# Patient Record
Sex: MALE | Race: BLACK OR AFRICAN AMERICAN | Employment: OTHER | URBAN - METROPOLITAN AREA
[De-identification: names, ages, dates, MRNs, and addresses within clinical notes are randomized per-mention and may not be internally consistent; named-entity substitution may affect disease eponyms.]

---

## 2017-08-22 ENCOUNTER — HOSPITAL ENCOUNTER (OUTPATIENT)
Dept: LAB | Age: 62
Discharge: HOME | End: 2017-08-22
Attending: PSYCHIATRY & NEUROLOGY | Admitting: PSYCHIATRY & NEUROLOGY

## 2017-08-22 LAB
ALBUMIN SERPL-MCNC: 4.2 GM/DL (ref 3.4–5)
ALP BLD-CCNC: 138 IU/L (ref 40–129)
ALT SERPL-CCNC: 50 U/L (ref 10–40)
ANION GAP SERPL CALCULATED.3IONS-SCNC: 15 MMOL/L (ref 4–16)
AST SERPL-CCNC: 109 IU/L (ref 15–37)
BILIRUB SERPL-MCNC: 0.6 MG/DL (ref 0–1)
BILIRUBIN DIRECT: 0.2 MG/DL (ref 0–0.3)
BILIRUBIN, INDIRECT: 0.4 MG/DL (ref 0–0.7)
BUN BLDV-MCNC: 9 MG/DL (ref 6–23)
CALCIUM SERPL-MCNC: 9.1 MG/DL (ref 8.3–10.6)
CHLORIDE BLD-SCNC: 103 MMOL/L (ref 99–110)
CHOLESTEROL: 136 MG/DL
CO2: 21 MMOL/L (ref 21–32)
CREAT SERPL-MCNC: 0.8 MG/DL (ref 0.9–1.3)
GFR AFRICAN AMERICAN: >60 ML/MIN/1.73M2
GFR NON-AFRICAN AMERICAN: >60 ML/MIN/1.73M2
GLUCOSE BLD-MCNC: 92 MG/DL (ref 70–140)
HDLC SERPL-MCNC: 60 MG/DL
LDL CHOLESTEROL DIRECT: 42 MG/DL
POTASSIUM SERPL-SCNC: 3.9 MMOL/L (ref 3.5–5.1)
SODIUM BLD-SCNC: 139 MMOL/L (ref 135–145)
TOTAL PROTEIN: 7.1 GM/DL (ref 6.4–8.2)
TRIGL SERPL-MCNC: 387 MG/DL
TSH HIGH SENSITIVITY: 2.15 UIU/ML (ref 0.27–4.2)

## 2017-08-24 LAB — T4 TOTAL: 4.62 UG/DL

## 2018-03-26 ENCOUNTER — HOSPITAL ENCOUNTER (OUTPATIENT)
Dept: LAB | Age: 63
Discharge: HOME | End: 2018-03-26
Attending: PSYCHIATRY & NEUROLOGY | Admitting: PSYCHIATRY & NEUROLOGY

## 2018-03-26 LAB
ALBUMIN SERPL-MCNC: 4.1 GM/DL (ref 3.4–5)
ALP BLD-CCNC: 150 IU/L (ref 40–129)
ALT SERPL-CCNC: 18 U/L (ref 10–40)
ANION GAP SERPL CALCULATED.3IONS-SCNC: 16 MMOL/L (ref 4–16)
AST SERPL-CCNC: 45 IU/L (ref 15–37)
BILIRUB SERPL-MCNC: 0.5 MG/DL (ref 0–1)
BILIRUBIN DIRECT: 0.2 MG/DL (ref 0–0.3)
BILIRUBIN, INDIRECT: 0.3 MG/DL (ref 0–0.7)
BUN BLDV-MCNC: 6 MG/DL (ref 6–23)
CALCIUM SERPL-MCNC: 9.7 MG/DL (ref 8.3–10.6)
CHLORIDE BLD-SCNC: 101 MMOL/L (ref 99–110)
CHOLESTEROL: 122 MG/DL
CO2: 24 MMOL/L (ref 21–32)
CREAT SERPL-MCNC: 0.8 MG/DL (ref 0.9–1.3)
GFR AFRICAN AMERICAN: >60 ML/MIN/1.73M2
GFR NON-AFRICAN AMERICAN: >60 ML/MIN/1.73M2
GLUCOSE BLD-MCNC: 99 MG/DL (ref 70–99)
HDLC SERPL-MCNC: 70 MG/DL
LDL CHOLESTEROL DIRECT: 48 MG/DL
POTASSIUM SERPL-SCNC: 3.9 MMOL/L (ref 3.5–5.1)
SODIUM BLD-SCNC: 141 MMOL/L (ref 135–145)
TOTAL PROTEIN: 7.7 GM/DL (ref 6.4–8.2)
TRIGL SERPL-MCNC: 102 MG/DL
TSH HIGH SENSITIVITY: 1.43 UIU/ML (ref 0.27–4.2)

## 2018-03-27 LAB — T4 TOTAL: 4.32 UG/DL

## 2018-07-02 ENCOUNTER — HOSPITAL ENCOUNTER (OUTPATIENT)
Dept: LAB | Age: 63
Discharge: HOME | End: 2018-07-02
Attending: INTERNAL MEDICINE | Admitting: INTERNAL MEDICINE

## 2018-07-02 LAB
ALBUMIN SERPL-MCNC: 4.5 GM/DL (ref 3.4–5)
ALP BLD-CCNC: 142 IU/L (ref 40–129)
ALT SERPL-CCNC: 77 U/L (ref 10–40)
AST SERPL-CCNC: 147 IU/L (ref 15–37)
BILIRUB SERPL-MCNC: 0.7 MG/DL (ref 0–1)
BILIRUBIN DIRECT: 0.2 MG/DL (ref 0–0.3)
BILIRUBIN, INDIRECT: 0.5 MG/DL (ref 0–0.7)
HEPATITIS B SURFACE ANTIGEN: NON REACTIVE
HEPATITIS C ANTIBODY: NON REACTIVE
TOTAL PROTEIN: 7.7 GM/DL (ref 6.4–8.2)

## 2018-07-03 LAB — HIV SCREEN: NON REACTIVE

## 2018-07-13 PROBLEM — F10.931 ALCOHOL WITHDRAWAL DELIRIUM (HCC): Status: ACTIVE | Noted: 2018-07-13

## 2018-08-09 ENCOUNTER — HOSPITAL ENCOUNTER (OUTPATIENT)
Dept: GENERAL RADIOLOGY | Age: 63
Discharge: HOME | End: 2018-08-09
Attending: NURSE PRACTITIONER | Admitting: NURSE PRACTITIONER

## 2018-08-09 DIAGNOSIS — M25.561 CHRONIC PAIN OF RIGHT KNEE: ICD-10-CM

## 2018-08-09 DIAGNOSIS — G89.29 CHRONIC PAIN OF RIGHT KNEE: ICD-10-CM

## 2018-11-17 ENCOUNTER — HOSPITAL ENCOUNTER (OUTPATIENT)
Dept: CT IMAGING | Age: 63
Discharge: HOME | End: 2018-11-17
Payer: MEDICARE

## 2018-11-17 DIAGNOSIS — R41.3 MEMORY LOSS: ICD-10-CM

## 2018-11-17 PROCEDURE — 70450 CT HEAD/BRAIN W/O DYE: CPT

## 2018-12-31 ENCOUNTER — HOSPITAL ENCOUNTER (OUTPATIENT)
Age: 63
Discharge: HOME | End: 2018-12-31
Payer: MEDICARE

## 2018-12-31 LAB — PROSTATE SPECIFIC ANTIGEN: 0.58 NG/ML (ref 0–4)

## 2018-12-31 PROCEDURE — G0103 PSA SCREENING: HCPCS

## 2018-12-31 PROCEDURE — 36415 COLL VENOUS BLD VENIPUNCTURE: CPT

## 2019-01-16 ENCOUNTER — HOSPITAL ENCOUNTER (OUTPATIENT)
Age: 64
Discharge: HOME | End: 2019-01-16
Payer: MEDICARE

## 2019-01-16 ENCOUNTER — HOSPITAL ENCOUNTER (OUTPATIENT)
Dept: GENERAL RADIOLOGY | Age: 64
Discharge: HOME | End: 2019-01-16
Payer: MEDICARE

## 2019-01-16 DIAGNOSIS — R05.9 COUGH: ICD-10-CM

## 2019-01-16 PROCEDURE — 71046 X-RAY EXAM CHEST 2 VIEWS: CPT

## 2019-03-04 ENCOUNTER — HOSPITAL ENCOUNTER (OUTPATIENT)
Dept: PULMONOLOGY | Age: 64
Discharge: HOME | End: 2019-03-04
Payer: MEDICARE

## 2019-03-04 LAB
DLCO %PRED: 118 %
DLCO PRED: NORMAL ML/MIN/MMHG
DLCO/VA %PRED: NORMAL %
DLCO/VA PRED: NORMAL ML/MIN/MMHG
DLCO/VA: NORMAL ML/MIN/MMHG
DLCO: NORMAL ML/MIN/MMHG
EXPIRATORY TIME-POST: NORMAL SEC
EXPIRATORY TIME: NORMAL SEC
FEF 25-75% %CHNG: NORMAL
FEF 25-75% %PRED-POST: NORMAL %
FEF 25-75% %PRED-PRE: NORMAL L/SEC
FEF 25-75% PRED: NORMAL L/SEC
FEF 25-75%-POST: NORMAL L/SEC
FEF 25-75%-PRE: NORMAL L/SEC
FEV1 %PRED-POST: 108 %
FEV1 %PRED-PRE: 101 %
FEV1 PRED: NORMAL L
FEV1-POST: NORMAL L
FEV1-PRE: NORMAL L
FEV1/FVC %PRED-POST: NORMAL %
FEV1/FVC %PRED-PRE: NORMAL %
FEV1/FVC PRED: NORMAL %
FEV1/FVC-POST: 69 %
FEV1/FVC-PRE: 81 %
FVC %PRED-POST: NORMAL L
FVC %PRED-PRE: NORMAL %
FVC PRED: NORMAL L
FVC-POST: NORMAL L
FVC-PRE: NORMAL L
GAW %PRED: NORMAL %
GAW PRED: NORMAL L/S/CMH2O
GAW: NORMAL L/S/CMH2O
IC %PRED: NORMAL %
IC PRED: NORMAL L
IC: NORMAL L
MEP: NORMAL
MIP: NORMAL
MVV %PRED-PRE: NORMAL %
MVV PRED: NORMAL L/MIN
MVV-PRE: NORMAL L/MIN
PEF %PRED-POST: NORMAL %
PEF %PRED-PRE: NORMAL L/SEC
PEF PRED: NORMAL L/SEC
PEF%CHNG: NORMAL
PEF-POST: NORMAL L/SEC
PEF-PRE: NORMAL L/SEC
RAW %PRED: NORMAL %
RAW PRED: NORMAL CMH2O/L/S
RAW: NORMAL CMH2O/L/S
RV %PRED: NORMAL %
RV PRED: NORMAL L
RV: NORMAL L
SVC %PRED: NORMAL %
SVC PRED: NORMAL L
SVC: NORMAL L
TLC %PRED: 115 %
TLC PRED: NORMAL L
TLC: NORMAL L
VA %PRED: NORMAL %
VA PRED: NORMAL L
VA: NORMAL L
VTG %PRED: NORMAL %
VTG PRED: NORMAL L
VTG: NORMAL L

## 2019-03-04 PROCEDURE — 94727 GAS DIL/WSHOT DETER LNG VOL: CPT

## 2019-03-04 PROCEDURE — 94729 DIFFUSING CAPACITY: CPT

## 2019-03-04 PROCEDURE — 94060 EVALUATION OF WHEEZING: CPT

## 2019-03-04 ASSESSMENT — PULMONARY FUNCTION TESTS
FEV1_PERCENT_PREDICTED_PRE: 101
FEV1_PERCENT_PREDICTED_POST: 108
FEV1/FVC_POST: 69
FEV1/FVC_PRE: 81

## 2019-03-12 ENCOUNTER — HOSPITAL ENCOUNTER (OUTPATIENT)
Dept: SLEEP CENTER | Age: 64
Discharge: HOME | End: 2019-03-12
Payer: MEDICARE

## 2019-03-12 PROCEDURE — 95810 POLYSOM 6/> YRS 4/> PARAM: CPT

## 2019-03-12 ASSESSMENT — SLEEP AND FATIGUE QUESTIONNAIRES
NECK CIRCUMFERENCE (INCHES): 17
HOW LIKELY ARE YOU TO NOD OFF OR FALL ASLEEP WHILE LYING DOWN TO REST IN THE AFTERNOON WHEN CIRCUMSTANCES PERMIT: 1
HOW LIKELY ARE YOU TO NOD OFF OR FALL ASLEEP WHILE SITTING INACTIVE IN A PUBLIC PLACE: 0
HOW LIKELY ARE YOU TO NOD OFF OR FALL ASLEEP WHEN YOU ARE A PASSENGER IN A CAR FOR AN HOUR WITHOUT A BREAK: 0
HOW LIKELY ARE YOU TO NOD OFF OR FALL ASLEEP WHILE WATCHING TV: 0
HOW LIKELY ARE YOU TO NOD OFF OR FALL ASLEEP IN A CAR, WHILE STOPPED FOR A FEW MINUTES IN TRAFFIC: 0
HOW LIKELY ARE YOU TO NOD OFF OR FALL ASLEEP WHILE SITTING AND READING: 0
HOW LIKELY ARE YOU TO NOD OFF OR FALL ASLEEP WHILE SITTING AND TALKING TO SOMEONE: 0
ESS TOTAL SCORE: 1
HOW LIKELY ARE YOU TO NOD OFF OR FALL ASLEEP WHILE SITTING QUIETLY AFTER LUNCH WITHOUT ALCOHOL: 0

## 2019-04-11 ENCOUNTER — HOSPITAL ENCOUNTER (OUTPATIENT)
Dept: SLEEP CENTER | Age: 64
Discharge: HOME | End: 2019-04-11
Payer: MEDICARE

## 2019-04-11 PROCEDURE — 95811 POLYSOM 6/>YRS CPAP 4/> PARM: CPT

## 2019-04-12 NOTE — PROGRESS NOTES
4/12/2019  sleep study  for Viky Arboleda  1955 is complete. Results are pending physician review.     HME: [facility]     Electronically signed by Azael Jerez on 4/12/2019 at 7:18 AM

## 2019-04-18 NOTE — PROGRESS NOTES
Results for the most recent sleep study on Estil Saunas  1955 are finalized and available. Please see media tab.     Electronically signed by Summer Rodriguez on 4/18/2019 at 5:06 AM

## 2019-06-05 ENCOUNTER — HOSPITAL ENCOUNTER (OUTPATIENT)
Dept: GENERAL RADIOLOGY | Age: 64
Discharge: HOME | End: 2019-06-05
Payer: MEDICARE

## 2019-06-05 ENCOUNTER — HOSPITAL ENCOUNTER (OUTPATIENT)
Age: 64
Discharge: HOME | End: 2019-06-05
Payer: MEDICARE

## 2019-06-05 DIAGNOSIS — M54.5 LOW BACK PAIN, UNSPECIFIED BACK PAIN LATERALITY, UNSPECIFIED CHRONICITY, WITH SCIATICA PRESENCE UNSPECIFIED: ICD-10-CM

## 2019-06-05 PROCEDURE — 72100 X-RAY EXAM L-S SPINE 2/3 VWS: CPT

## 2019-06-17 PROBLEM — Z99.89 OSA ON CPAP: Status: ACTIVE | Noted: 2019-06-17

## 2019-06-17 PROBLEM — G47.33 OSA ON CPAP: Status: ACTIVE | Noted: 2019-06-17

## 2019-06-17 PROBLEM — J44.9 COPD, MILD (HCC): Status: ACTIVE | Noted: 2019-06-17

## 2019-12-04 ENCOUNTER — HOSPITAL ENCOUNTER (OUTPATIENT)
Dept: GENERAL RADIOLOGY | Age: 64
Discharge: HOME | End: 2019-12-04
Payer: MEDICARE

## 2019-12-04 ENCOUNTER — HOSPITAL ENCOUNTER (OUTPATIENT)
Age: 64
Discharge: HOME | End: 2019-12-04
Payer: MEDICARE

## 2019-12-04 DIAGNOSIS — R05.3 CHRONIC COUGH: ICD-10-CM

## 2019-12-04 PROCEDURE — 71046 X-RAY EXAM CHEST 2 VIEWS: CPT

## 2020-02-05 ENCOUNTER — INITIAL CONSULT (OUTPATIENT)
Dept: PULMONOLOGY | Age: 65
End: 2020-02-05
Payer: MEDICARE

## 2020-02-05 ENCOUNTER — TELEPHONE (OUTPATIENT)
Dept: PULMONOLOGY | Age: 65
End: 2020-02-05

## 2020-02-05 VITALS
BODY MASS INDEX: 40.09 KG/M2 | WEIGHT: 280 LBS | DIASTOLIC BLOOD PRESSURE: 84 MMHG | OXYGEN SATURATION: 98 % | SYSTOLIC BLOOD PRESSURE: 116 MMHG | HEART RATE: 80 BPM | HEIGHT: 70 IN

## 2020-02-05 PROBLEM — Z72.0 TOBACCO ABUSE: Status: ACTIVE | Noted: 2020-02-05

## 2020-02-05 PROBLEM — R05.3 CHRONIC COUGH: Status: ACTIVE | Noted: 2020-02-05

## 2020-02-05 PROBLEM — R06.02 SHORTNESS OF BREATH: Status: ACTIVE | Noted: 2020-02-05

## 2020-02-05 LAB
EXPIRATORY TIME-POST: NORMAL
EXPIRATORY TIME: NORMAL
FEF 25-75% %CHNG: NORMAL
FEF 25-75% %PRED-POST: NORMAL
FEF 25-75% %PRED-PRE: NORMAL
FEF 25-75% PRED: NORMAL
FEF 25-75%-POST: NORMAL
FEF 25-75%-PRE: NORMAL
FEV1 %PRED-POST: 79.3 %
FEV1 %PRED-PRE: 75.1 %
FEV1 PRED: 3.03 L
FEV1-POST: 2.41 L
FEV1-PRE: 2.28 L
FEV1/FVC %PRED-POST: 98.9 %
FEV1/FVC %PRED-PRE: 90.5 %
FEV1/FVC PRED: 77.5 %
FEV1/FVC-POST: 76.7 %
FEV1/FVC-PRE: 70.2 %
FVC %PRED-POST: 79.6 L
FVC %PRED-PRE: 82.4 %
FVC PRED: 3.94 L
FVC-POST: 3.14 L
FVC-PRE: 3.25 L
PEF %PRED-POST: NORMAL
PEF %PRED-PRE: NORMAL
PEF PRED: NORMAL
PEF%CHNG: NORMAL
PEF-POST: NORMAL
PEF-PRE: NORMAL

## 2020-02-05 PROCEDURE — G8926 SPIRO NO PERF OR DOC: HCPCS | Performed by: INTERNAL MEDICINE

## 2020-02-05 PROCEDURE — G8417 CALC BMI ABV UP PARAM F/U: HCPCS | Performed by: INTERNAL MEDICINE

## 2020-02-05 PROCEDURE — 99204 OFFICE O/P NEW MOD 45 MIN: CPT | Performed by: INTERNAL MEDICINE

## 2020-02-05 PROCEDURE — 4004F PT TOBACCO SCREEN RCVD TLK: CPT | Performed by: INTERNAL MEDICINE

## 2020-02-05 PROCEDURE — 3017F COLORECTAL CA SCREEN DOC REV: CPT | Performed by: INTERNAL MEDICINE

## 2020-02-05 PROCEDURE — G8484 FLU IMMUNIZE NO ADMIN: HCPCS | Performed by: INTERNAL MEDICINE

## 2020-02-05 PROCEDURE — G8427 DOCREV CUR MEDS BY ELIG CLIN: HCPCS | Performed by: INTERNAL MEDICINE

## 2020-02-05 PROCEDURE — 94060 EVALUATION OF WHEEZING: CPT | Performed by: INTERNAL MEDICINE

## 2020-02-05 PROCEDURE — 3023F SPIROM DOC REV: CPT | Performed by: INTERNAL MEDICINE

## 2020-02-05 RX ORDER — DOXYCYCLINE HYCLATE 100 MG
100 TABLET ORAL 2 TIMES DAILY
Qty: 14 TABLET | Refills: 0 | Status: SHIPPED | OUTPATIENT
Start: 2020-02-05 | End: 2020-02-12

## 2020-02-05 RX ORDER — PREDNISONE 1 MG/1
TABLET ORAL
Qty: 38 TABLET | Refills: 0 | Status: SHIPPED | OUTPATIENT
Start: 2020-02-05 | End: 2020-02-25

## 2020-02-05 RX ORDER — OMEPRAZOLE 40 MG/1
40 CAPSULE, DELAYED RELEASE ORAL
Qty: 90 CAPSULE | Refills: 1 | Status: SHIPPED | OUTPATIENT
Start: 2020-02-05 | End: 2020-08-25 | Stop reason: SDUPTHER

## 2020-02-05 ASSESSMENT — PULMONARY FUNCTION TESTS
FEV1/FVC_PERCENT_PREDICTED_POST: 98.9
FVC_PRE: 3.25
FEV1_PRE: 2.28
FEV1_PERCENT_PREDICTED_POST: 79.3
FVC_PERCENT_PREDICTED_POST: 79.6
FEV1_PREDICTED: 3.03
FEV1/FVC_PREDICTED: 77.5
FVC_PERCENT_PREDICTED_PRE: 82.4
FEV1_POST: 2.41
FVC_POST: 3.14
FEV1/FVC_POST: 76.7
FEV1/FVC_PRE: 70.2
FEV1/FVC_PERCENT_PREDICTED_PRE: 90.5
FEV1_PERCENT_PREDICTED_PRE: 75.1
FVC_PREDICTED: 3.94

## 2020-02-05 NOTE — PROGRESS NOTES
Subjective:   CHIEF COMPLAINT / HPI: Chronic cough, mild COPD, shortness of breath, tobacco abuse, obstructive sleep apnea  Linward Cockayne is a 59-year-old male referred here for a second opinion concerning his persistent cough, worsening shortness of breath, mild COPD, tobacco abuse and a history of obstructive sleep apnea presently untreated because of his persistent cough. He feels though there is a catch in his throat and he cannot take a deep breath without coughing or wheezing. He expectorates a small amount of mucus on a daily basis but is not purulent. Unfortunately continues to smoke against medical advice of 1/2 pack a day but most of his adult life he smoked a pack a day for over 40 years. He is not aware of a family history of chronic lung disease or lung cancer. He currently is using Lucetta Hensen, Incruse and has a pro-air rescue inhaler. He also has a nebulizer at home which is malfunctioning and has albuterol solution to use as needed. He is not on home oxygen. He also states that his cough seems to be much worse when he is seen warm houses or has increased stress. He denies significant postnasal drip. He also denies a history of GERD although he has noted some mild chest discomfort at night but no definite signs of reflux. He denies chest pain or hemoptysis and denies a history of heart disease. He does carry history of alcohol abuse. He states that he quit drinking altogether 1-1/2 years ago. He mentions that he is on total disability for multiple medical problems. Office spirometry demonstrates an FEV1 of 2.28 L with an FVC of 3.25 L consistent with a mild obstructive defect.   Following bronchodilators he had a significant response in his small airways  Past Medical History:  Past Medical History:   Diagnosis Date    Asthma     Chronic cough 2/5/2020    COPD, mild ([address] 6/17/2019    Hypertension     Psychiatric problem     Shortness of breath 2/5/2020    Tobacco abuse 2/5/2020 Current Medications:    No current facility-administered medications for this visit. Allergies   Allergen Reactions    Codeine Itching       Social History:    Social History     Socioeconomic History    Marital status: Single     Spouse name: None    Number of children: 2    Years of education: None    Highest education level: None   Occupational History    None   Social Needs    Financial resource strain: None    Food insecurity:     Worry: None     Inability: None    Transportation needs:     Medical: None     Non-medical: None   Tobacco Use    Smoking status: Current Every Day Smoker     Packs/day: 0.75     Years: 48.00     Pack years: 36.00     Types: Cigarettes    Smokeless tobacco: Never Used    Tobacco comment: states only smoke 12 a day   Substance and Sexual Activity    Alcohol use: Yes     Comment: half pint of vodka every day, 6-8 cans of beer a day    Drug use: No    Sexual activity: Never     Partners: Female   Lifestyle    Physical activity:     Days per week: None     Minutes per session: None    Stress: None   Relationships    Social connections:     Talks on phone: None     Gets together: None     Attends Yazidism service: None     Active member of club or organization: None     Attends meetings of clubs or organizations: None     Relationship status: None    Intimate partner violence:     Fear of current or ex partner: None     Emotionally abused: None     Physically abused: None     Forced sexual activity: None   Other Topics Concern    None   Social History Narrative    None       Family History:    Family History   Problem Relation Age of Onset    Cancer Brother          REVIEW OF SYSTEMS:    CONSTITUTIONAL:  negative for fevers, chills, diaphoresis,appetite change, night sweats and unexpected weight change.    HEENT:  negative for hearing loss,  sinus pressure, nasal congestion, epistaxis   RESPIRATORY:  See HPI  CARDIOVASCULAR:  negative for chest pain, significant response in his small airways    Assessment:     1. Chronic cough    2. Shortness of breath    3. Tobacco abuse    4. COPD, mild ([address]    5. SHAHNAZ on CPAP        Plan:   I am not sure what the definite etiology is for his persistent cough but certainly could be related to his persistent tobacco use and mild COPD with an asthmatic tendency. I do like his current bronchodilator therapy but will add a short tapering course of oral prednisone. I also think he is a candidate for long-term GERD prophylaxis and will start omeprazole 20 mg daily. I would like to obtain a CT chest because of his persistent coughing, shortness of breath with tobacco abuse and mild COPD. I did  him on cigarette smoking cessation. Hopefully he will be able to get back on his CPAP once his cough is under better control. I will continue to follow him  Return in about 6 weeks (around 3/18/2020) for Recheck for COPD, Recheck for Cough, Recheck for Shortness of Breath. This dictation was performed with a verbal recognition program and it was checked for errors. It is possible that there are still dictated errors within this office note. Any errors should be brought immediately to my attention for correction. All efforts were made to ensure that this office note is accurate.

## 2020-02-18 ENCOUNTER — HOSPITAL ENCOUNTER (OUTPATIENT)
Dept: CT IMAGING | Age: 65
Discharge: HOME | End: 2020-02-18
Payer: MEDICARE

## 2020-02-18 PROCEDURE — 71250 CT THORAX DX C-: CPT

## 2020-02-19 NOTE — RESULT ENCOUNTER NOTE
I spoke to Mr. Zahra Rosales concerning his CT chest which does demonstrate centrilobular emphysema but also what appears to be infectious bronchiolitis involving the right upper lobe. He states that he is just in the process of finishing his antibiotic and oral steroid and we decided we would wait for several days to see how he does. If he fails to improve then I will see him back in the office sooner.   Once again I urged him to quit smoking as soon as possible

## 2020-02-24 ENCOUNTER — TELEPHONE (OUTPATIENT)
Dept: PULMONOLOGY | Age: 65
End: 2020-02-24

## 2020-02-24 NOTE — TELEPHONE ENCOUNTER
Patient called and states that he is not doing any better. He still has some chest congestion. States he has a productive cough and it is clear in color. No fever or chills. Please advise.

## 2020-02-25 ENCOUNTER — OFFICE VISIT (OUTPATIENT)
Dept: PULMONOLOGY | Age: 65
End: 2020-02-25
Payer: MEDICARE

## 2020-02-25 VITALS
HEIGHT: 70 IN | SYSTOLIC BLOOD PRESSURE: 128 MMHG | HEART RATE: 64 BPM | BODY MASS INDEX: 40.09 KG/M2 | WEIGHT: 280 LBS | OXYGEN SATURATION: 96 % | DIASTOLIC BLOOD PRESSURE: 72 MMHG

## 2020-02-25 PROCEDURE — G8926 SPIRO NO PERF OR DOC: HCPCS | Performed by: INTERNAL MEDICINE

## 2020-02-25 PROCEDURE — G8417 CALC BMI ABV UP PARAM F/U: HCPCS | Performed by: INTERNAL MEDICINE

## 2020-02-25 PROCEDURE — 3023F SPIROM DOC REV: CPT | Performed by: INTERNAL MEDICINE

## 2020-02-25 PROCEDURE — 99213 OFFICE O/P EST LOW 20 MIN: CPT | Performed by: INTERNAL MEDICINE

## 2020-02-25 PROCEDURE — G8427 DOCREV CUR MEDS BY ELIG CLIN: HCPCS | Performed by: INTERNAL MEDICINE

## 2020-02-25 PROCEDURE — 4004F PT TOBACCO SCREEN RCVD TLK: CPT | Performed by: INTERNAL MEDICINE

## 2020-02-25 PROCEDURE — G8484 FLU IMMUNIZE NO ADMIN: HCPCS | Performed by: INTERNAL MEDICINE

## 2020-02-25 PROCEDURE — 3017F COLORECTAL CA SCREEN DOC REV: CPT | Performed by: INTERNAL MEDICINE

## 2020-02-25 RX ORDER — FLUTICASONE PROPIONATE 50 MCG
SPRAY, SUSPENSION (ML) NASAL
Qty: 3 BOTTLE | Refills: 3 | Status: SHIPPED | OUTPATIENT
Start: 2020-02-25

## 2020-02-25 RX ORDER — FLUTICASONE PROPIONATE 50 MCG
2 SPRAY, SUSPENSION (ML) NASAL DAILY
Qty: 1 BOTTLE | Refills: 11 | Status: SHIPPED | OUTPATIENT
Start: 2020-02-25 | End: 2020-02-25

## 2020-02-25 NOTE — PROGRESS NOTES
SUBJECTIVE:  Chief Complaint: Chronic cough, mild COPD, tobacco abuse, SHAHNAZ but not using CPAP presently  Mr. Zarha Rosales called our office complaining of persistent cough and mild sputum expectoration. I asked him to come in to see us so I could reexamine him. He continues on McGee, Incruse and his pro-air rescue inhaler. He states that he has had inhaled nasal steroids in the past but presently is not using them. Because of his persistent cough he has not been using his CPAP. He denies any chest pain or chest discomfort and states that the sputum he is expectorating is not purulent    OBJECTIVE:  /72   Pulse 64   Ht 5' 10\" (1.778 m)   Wt 280 lb (127 kg)   SpO2 96%   BMI 40.18 kg/m²      Physical Exam:  Constitutional:  He appears well developed and well-nourished. Moderately overweight  Neck:  Supple, No palpable lymphadenopathy, No JVD, increased nasal mucosal edema and thin secretions but no evidence of polyposis  Cardiovascular:  S1, S2 Normal, Regular rhythm, no murmurs or gallops, No pericardial  rubs. Pulmonary: Sounds are surprisingly clear throughout all lung fields without wheezing or rhonchi  Abdomen: Not examined  Extremities: no edema, No DVT  Neurologic:  Awake and Alert, No focal deficits    Radiology: Chest x-ray on 12/4/2019 showed no acute process  PFT: Office spirometry on 2/5/2020 demonstrated a mild obstructive defect with a significant response to bronchodilators in his small airways        ASSESSMENT:    1. Chronic cough    2. COPD, mild ([address]    3. SHAHNAZ on CPAP    4. Shortness of breath    5. Tobacco abuse          PLAN:   I am concerned about his nasal congestion and postnasal drip as a possible etiology for his persistent cough. Obviously smoking cessation is paramount. I am going to start him back on Flonase and told him that if his symptoms persist he may require ENT evaluation to make sure he does not have chronic sinusitis.   I will continue to follow him  No follow-ups on

## 2020-03-19 ENCOUNTER — OFFICE VISIT (OUTPATIENT)
Dept: PULMONOLOGY | Age: 65
End: 2020-03-19
Payer: MEDICARE

## 2020-03-19 VITALS
TEMPERATURE: 98.2 F | SYSTOLIC BLOOD PRESSURE: 114 MMHG | WEIGHT: 290 LBS | HEIGHT: 70 IN | DIASTOLIC BLOOD PRESSURE: 74 MMHG | HEART RATE: 66 BPM | OXYGEN SATURATION: 97 % | BODY MASS INDEX: 41.52 KG/M2

## 2020-03-19 PROCEDURE — G8427 DOCREV CUR MEDS BY ELIG CLIN: HCPCS | Performed by: INTERNAL MEDICINE

## 2020-03-19 PROCEDURE — G8417 CALC BMI ABV UP PARAM F/U: HCPCS | Performed by: INTERNAL MEDICINE

## 2020-03-19 PROCEDURE — 4004F PT TOBACCO SCREEN RCVD TLK: CPT | Performed by: INTERNAL MEDICINE

## 2020-03-19 PROCEDURE — 3017F COLORECTAL CA SCREEN DOC REV: CPT | Performed by: INTERNAL MEDICINE

## 2020-03-19 PROCEDURE — 99213 OFFICE O/P EST LOW 20 MIN: CPT | Performed by: INTERNAL MEDICINE

## 2020-03-19 PROCEDURE — G8484 FLU IMMUNIZE NO ADMIN: HCPCS | Performed by: INTERNAL MEDICINE

## 2020-03-19 PROCEDURE — 3023F SPIROM DOC REV: CPT | Performed by: INTERNAL MEDICINE

## 2020-03-19 PROCEDURE — G8926 SPIRO NO PERF OR DOC: HCPCS | Performed by: INTERNAL MEDICINE

## 2020-03-19 NOTE — PROGRESS NOTES
SUBJECTIVE:  Chief Complaint: Mild COPD, chronic cough, obstructive sleep apnea, tobacco abuse  Mr. Alicia Keith states that he went to [place] last week and was put on antibiotics and oral steroids for a bronchitic exacerbation and he has not completed that yet. He refuses the use [facility]. There is been no change in his long acting bronchodilators. He is still not using CPAP because of his persistent cough which has improved. He still notices some hoarseness and inquired about ENT exam.  Unfortunately continues to smoke [address] OBJECTIVE:  /74   Pulse 66   Temp 98.2 °F (36.8 °C)   Ht 5' 10\" (1.778 m)   Wt 290 lb (131.5 kg)   SpO2 97%   BMI 41.61 kg/m²      Physical Exam:  Constitutional:  He appears well developed and well-nourished. Moderately overweight  Neck:  Supple, No palpable lymphadenopathy, No JVD, increased neck girth, not much hoarseness noted over the neck  Cardiovascular:  S1, S2 Normal, Regular rhythm, no murmurs or gallops, No pericardial  rubs. Pulmonary: Breath sounds are fairly clear although mildly diminished bilaterally no hearing wheezing or rhonchi  Abdomen: Not examined  Extremities: no edema, No DVT  Neurologic:  Awake and Alert, No focal deficits    Radiology: CT chest without contrast on 2/18/2020 showed centrilobular emphysema with minimal groundglass opacities in the right upper lobe and borderline enlarged mediastinal lymph node  PFT: Office spirometry on 2/5/2020 demonstrated a mild obstructive defect with a significant response to bronchodilators in his small airways        ASSESSMENT:    1. COPD, mild ([address]    2. SHAHNAZ on CPAP    3. Shortness of breath    4. Chronic cough    5. Tobacco abuse          PLAN:   Once again I urged him to quit smoking as soon as possible. If he continues to have cough or hoarseness and I do think ENT exam would be advisable. I will make no change in his current bronchodilator therapy.   I will continue to follow him  Return in about 6 months (around 9/19/2020) for Recheck for Cough, Recheck for COPD, Recheck for Obstructive Sleep Apnea, Tobacco abuse. This dictation was performed with a verbal recognition program and it was checked for errors. It is possible that there are still dictated errors within this office note. Any errors should be brought immediately to my attention for correction. All efforts were made to ensure that this office note is accurate.

## 2020-04-21 ENCOUNTER — ANESTHESIA EVENT (OUTPATIENT)
Dept: ENDOSCOPY | Age: 65
End: 2020-04-21
Payer: MEDICARE

## 2020-04-21 ASSESSMENT — COPD QUESTIONNAIRES: CAT_SEVERITY: MILD

## 2020-04-21 ASSESSMENT — LIFESTYLE VARIABLES: SMOKING_STATUS: 1

## 2020-04-21 ASSESSMENT — ENCOUNTER SYMPTOMS: SHORTNESS OF BREATH: 1

## 2020-04-21 NOTE — ANESTHESIA PRE PROCEDURE
Department of Anesthesiology  Preprocedure Note       Name:  Iliana Delgado   Age:  59 y.o.  :  1955                                          MRN:  [id number]         Date:  2020      Surgeon: Eren Wade):  Leti David MD    Procedure: EGD ESOPHAGOGASTRODUODENOSCOPY DILATATION (N/A )    Medications prior to admission:   Prior to Admission medications    Medication Sig Start Date End Date Taking?  Authorizing Provider   Melatonin 5 MG CAPS Take 5 mg by mouth nightly   Yes Historical Provider, MD   fluticasone (FLONASE) 50 MCG/ACT nasal spray SHAKE LIQUID AND USE 2 SPRAYS IN EACH NOSTRIL DAILY 20   Chadwick Hammonds MD   omeprazole (PRILOSEC) 40 MG delayed release capsule Take 1 capsule by mouth every morning (before breakfast) 20   Chadwick Hammonds MD   CPAP Machine MISC 6 cm by CPAP route nightly    Historical Provider, MD   INCRUSE ELLIPTA 62.5 MCG/INH AEPB INL 1 PUFF PO AT THE SAME TIME QD 19   Historical Provider, MD   BREO ELLIPTA 100-25 MCG/INH AEPB inhaler INL 1 PUFF PO AT THE SAME TIME QD 19   Historical Provider, MD   traZODone (DESYREL) 100 MG tablet Take 100 mg by mouth nightly 5/15/17   Historical Provider, MD   atorvastatin (LIPITOR) 40 MG tablet Take 40 mg by mouth nightly 17   Historical Provider, MD   doxazosin (CARDURA) 4 MG tablet Take 4 mg by mouth daily  18   Historical Provider, MD   metoprolol tartrate (LOPRESSOR) 25 MG tablet Take 25 mg by mouth 2 times daily 17   Historical Provider, MD   citalopram (CELEXA) 40 MG tablet Take 40 mg by mouth daily    Historical Provider, MD   albuterol sulfate  (90 Base) MCG/ACT inhaler Inhale 2 puffs into the lungs every 4 hours as needed 3/2/17   Historical Provider, MD   folic acid (FOLVITE) 1 MG tablet Take 1 mg by mouth daily 18   Historical Provider, MD   amLODIPine (NORVASC) 5 MG tablet Take 5 mg by mouth daily D 11/10/17   Historical Provider, MD   cetirizine (ZYRTEC) 10 MG tablet Take 10 mg by ([facility] J44.9    SHAHNAZ on CPAP G47.33, Z99.89    Tobacco abuse Z72.0    Shortness of breath R06.02    Chronic cough R05       Past Medical History:        Diagnosis Date    Asthma     Chronic cough 2/5/2020    COPD, mild ([facility] 6/17/2019    Follows with Dr. Michi Tovar GERD (gastroesophageal reflux disease)     Hypertension     Murmur, heart     Follows with PCP    SHAHNAZ on CPAP     Psychiatric problem     Shortness of breath 2/5/2020    Tobacco abuse 2/5/2020       Past Surgical History:        Procedure Laterality Date    CARDIAC CATHETERIZATION  2008    No stents per pt    COLONOSCOPY  2015    Polypectomy - [place]    KNEE ARTHROSCOPY Left Last: 2004    x2  - cleaned up       Social History:    Social History     Tobacco Use    Smoking status: Current Every Day Smoker     Packs/day: 0.75     Years: 50.00     Pack years: 37.50     Types: Cigarettes     Start date: 1970    Smokeless tobacco: Never Used    Tobacco comment: states only smoke 12 a day   Substance Use Topics    Alcohol use: Yes     Alcohol/week: 8.0 standard drinks     Types: 8 Cans of beer per week     Comment: half pint of vodka every day - stopped  3/2020 per pt                                Ready to quit: Not Answered  Counseling given: Not Answered  Comment: states only smoke 12 a day      Vital Signs (Current):   Vitals:    04/17/20 1046   Weight: 280 lb (127 kg)   Height: 5' 10\" (1.778 m)                                              BP Readings from Last 3 Encounters:   03/19/20 114/74   02/25/20 128/72   02/05/20 116/84       NPO Status:                12 hrs. BMI:   Wt Readings from Last 3 Encounters:   03/19/20 290 lb (131.5 kg)   02/25/20 280 lb (127 kg)   02/05/20 280 lb (127 kg)     Body mass index is 40.18 kg/m².     CBC  Lab Results   Component Value Date/Time    WBC 5.9 07/14/2018 06:06 AM    HGB 13.2 (L) 07/14/2018 06:06 AM    HCT 39.6 (L) 07/14/2018 06:06 AM  07/14/2018 06:06 AM     RENAL  Lab Results   Component Value Date/Time     07/14/2018 06:06 AM    K 3.5 07/14/2018 06:06 AM     07/14/2018 06:06 AM    CO2 22 07/14/2018 06:06 AM    BUN 7 07/14/2018 06:06 AM    CREATININE 0.8 (L) 07/14/2018 06:06 AM    GLUCOSE 103 (H) 07/14/2018 06:06 AM       Anesthesia Evaluation  Patient summary reviewed and Nursing notes reviewed no history of anesthetic complications:   Airway: Mallampati: I  TM distance: >3 FB   Neck ROM: full  Mouth opening: > = 3 FB Dental: normal exam         Pulmonary:normal exam    (+) COPD (last exacerbation 3/2020 per Care Everywhere): mild,  shortness of breath:  sleep apnea: on CPAP,  asthma (inhaler x 2): current smoker (1ppd x ~46 years)          Patient smoked on day of surgery. ROS comment: Smoker - 37.5 pack years   Cardiovascular:  Exercise tolerance: poor (<4 METS),   (+) hypertension (on beta blocker and ASA):, hyperlipidemia         Beta Blocker:  Dose within 24 Hrs      ROS comment: Cardiac cath 2011    LM = 0%  LAD = 60-70% proximal.  LCX = 0%  RCA = 0%  Left Ventricular Angiography: Normal left ventricular size and function and Left ventricular ejection fraction (LVEF) of 60 %    Valve Disease: None    Impression:    60-70% proximal LAD stenosis. Medical treatment            Neuro/Psych:   (+) psychiatric history (hx ETOH abuse, onset age 13. Multiple admissions in Care Everywhere for alcohol dependency and alcohol withdrawal syndrome from 1990's Unknown drinking status):depression/anxiety             GI/Hepatic/Renal:   (+) GERD (on PPI):, renal disease (BPH):, morbid obesity (BMI: 40)         ROS comment: dysphagia. Endo/Other: Negative Endo/Other ROS             Pt had no PAT visit        ROS comment: Left knee sx x 2, 2004 Abdominal:   (+) obese,         Vascular: negative vascular ROS.                                    Anesthesia Plan      general     ASA 3           MIPS: Prophylactic antiemetics administered. Anesthetic plan and risks discussed with patient. Annie Souza, DIANA - CNP Chart review only. The patient was not seen or examined by the PAT NP.  4/21/2020        Pre Anesthesia Evaluation complete. Anesthesia plan, risks, benefits, alternatives, and personnel discussed with patient and/or legal guardian. Patient and/or legal guardian verbalized an understanding and agreed to proceed. Anesthesia plan discussed with care team members and agreed upon.   DIANA Stevenson - CRNA  4/22/2020

## 2020-04-22 ENCOUNTER — ANESTHESIA (OUTPATIENT)
Dept: ENDOSCOPY | Age: 65
End: 2020-04-22
Payer: MEDICARE

## 2020-04-22 ENCOUNTER — HOSPITAL ENCOUNTER (OUTPATIENT)
Age: 65
Setting detail: OUTPATIENT SURGERY
Discharge: HOME | End: 2020-04-22
Attending: SPECIALIST | Admitting: SPECIALIST
Payer: MEDICARE

## 2020-04-22 VITALS
RESPIRATION RATE: 1 BRPM | SYSTOLIC BLOOD PRESSURE: 128 MMHG | OXYGEN SATURATION: 78 % | TEMPERATURE: 98.6 F | DIASTOLIC BLOOD PRESSURE: 70 MMHG

## 2020-04-22 VITALS
OXYGEN SATURATION: 96 % | HEART RATE: 76 BPM | SYSTOLIC BLOOD PRESSURE: 129 MMHG | TEMPERATURE: 97.4 F | RESPIRATION RATE: 16 BRPM | WEIGHT: 280 LBS | DIASTOLIC BLOOD PRESSURE: 72 MMHG | HEIGHT: 70 IN | BODY MASS INDEX: 40.09 KG/M2

## 2020-04-22 PROCEDURE — 2580000003 HC RX 258: Performed by: SPECIALIST

## 2020-04-22 PROCEDURE — 7100000001 HC PACU RECOVERY - ADDTL 15 MIN: Performed by: SPECIALIST

## 2020-04-22 PROCEDURE — 2500000003 HC RX 250 WO HCPCS: Performed by: ANESTHESIOLOGY

## 2020-04-22 PROCEDURE — 3609017700 HC EGD DILATION GASTRIC/DUODENAL STRICTURE: Performed by: SPECIALIST

## 2020-04-22 PROCEDURE — 6370000000 HC RX 637 (ALT 250 FOR IP): Performed by: ANESTHESIOLOGY

## 2020-04-22 PROCEDURE — 7100000000 HC PACU RECOVERY - FIRST 15 MIN: Performed by: SPECIALIST

## 2020-04-22 PROCEDURE — 3700000001 HC ADD 15 MINUTES (ANESTHESIA): Performed by: SPECIALIST

## 2020-04-22 PROCEDURE — 3700000000 HC ANESTHESIA ATTENDED CARE: Performed by: SPECIALIST

## 2020-04-22 PROCEDURE — 7100000011 HC PHASE II RECOVERY - ADDTL 15 MIN: Performed by: SPECIALIST

## 2020-04-22 PROCEDURE — C1726 CATH, BAL DIL, NON-VASCULAR: HCPCS | Performed by: SPECIALIST

## 2020-04-22 PROCEDURE — 6360000002 HC RX W HCPCS: Performed by: NURSE ANESTHETIST, CERTIFIED REGISTERED

## 2020-04-22 PROCEDURE — 2709999900 HC NON-CHARGEABLE SUPPLY: Performed by: SPECIALIST

## 2020-04-22 PROCEDURE — 7100000010 HC PHASE II RECOVERY - FIRST 15 MIN: Performed by: SPECIALIST

## 2020-04-22 PROCEDURE — 6370000000 HC RX 637 (ALT 250 FOR IP): Performed by: NURSE ANESTHETIST, CERTIFIED REGISTERED

## 2020-04-22 PROCEDURE — 2500000003 HC RX 250 WO HCPCS: Performed by: NURSE ANESTHETIST, CERTIFIED REGISTERED

## 2020-04-22 RX ORDER — DEXAMETHASONE SODIUM PHOSPHATE 4 MG/ML
INJECTION, SOLUTION INTRA-ARTICULAR; INTRALESIONAL; INTRAMUSCULAR; INTRAVENOUS; SOFT TISSUE PRN
Status: DISCONTINUED | OUTPATIENT
Start: 2020-04-22 | End: 2020-04-22 | Stop reason: SDUPTHER

## 2020-04-22 RX ORDER — SODIUM CHLORIDE, SODIUM LACTATE, POTASSIUM CHLORIDE, CALCIUM CHLORIDE 600; 310; 30; 20 MG/100ML; MG/100ML; MG/100ML; MG/100ML
INJECTION, SOLUTION INTRAVENOUS CONTINUOUS
Status: DISCONTINUED | OUTPATIENT
Start: 2020-04-22 | End: 2020-04-22 | Stop reason: HOSPADM

## 2020-04-22 RX ORDER — ONDANSETRON 2 MG/ML
INJECTION INTRAMUSCULAR; INTRAVENOUS PRN
Status: DISCONTINUED | OUTPATIENT
Start: 2020-04-22 | End: 2020-04-22 | Stop reason: SDUPTHER

## 2020-04-22 RX ORDER — ALBUTEROL SULFATE 90 UG/1
AEROSOL, METERED RESPIRATORY (INHALATION) PRN
Status: DISCONTINUED | OUTPATIENT
Start: 2020-04-22 | End: 2020-04-22 | Stop reason: SDUPTHER

## 2020-04-22 RX ORDER — SUCCINYLCHOLINE CHLORIDE 20 MG/ML
INJECTION INTRAMUSCULAR; INTRAVENOUS PRN
Status: DISCONTINUED | OUTPATIENT
Start: 2020-04-22 | End: 2020-04-22 | Stop reason: SDUPTHER

## 2020-04-22 RX ORDER — PROPOFOL 10 MG/ML
INJECTION, EMULSION INTRAVENOUS PRN
Status: DISCONTINUED | OUTPATIENT
Start: 2020-04-22 | End: 2020-04-22 | Stop reason: SDUPTHER

## 2020-04-22 RX ORDER — LIDOCAINE HYDROCHLORIDE 20 MG/ML
INJECTION, SOLUTION INFILTRATION; PERINEURAL PRN
Status: DISCONTINUED | OUTPATIENT
Start: 2020-04-22 | End: 2020-04-22 | Stop reason: SDUPTHER

## 2020-04-22 RX ORDER — LIDOCAINE HYDROCHLORIDE 20 MG/ML
5 INJECTION, SOLUTION EPIDURAL; INFILTRATION; INTRACAUDAL; PERINEURAL ONCE
Status: DISCONTINUED | OUTPATIENT
Start: 2020-04-22 | End: 2020-04-22 | Stop reason: HOSPADM

## 2020-04-22 RX ADMIN — SODIUM CHLORIDE, POTASSIUM CHLORIDE, SODIUM LACTATE AND CALCIUM CHLORIDE: 600; 310; 30; 20 INJECTION, SOLUTION INTRAVENOUS at 07:18

## 2020-04-22 RX ADMIN — DEXAMETHASONE SODIUM PHOSPHATE 4 MG: 4 INJECTION, SOLUTION INTRAMUSCULAR; INTRAVENOUS at 08:24

## 2020-04-22 RX ADMIN — Medication 4 ML: at 09:33

## 2020-04-22 RX ADMIN — LIDOCAINE HYDROCHLORIDE 100 MG: 20 INJECTION, SOLUTION INFILTRATION; PERINEURAL at 08:27

## 2020-04-22 RX ADMIN — PROPOFOL 250 MG: 10 INJECTION, EMULSION INTRAVENOUS at 08:27

## 2020-04-22 RX ADMIN — PROPOFOL 50 MG: 10 INJECTION, EMULSION INTRAVENOUS at 08:32

## 2020-04-22 RX ADMIN — SUCCINYLCHOLINE CHLORIDE 100 MG: 20 INJECTION, SOLUTION INTRAMUSCULAR; INTRAVENOUS at 08:27

## 2020-04-22 RX ADMIN — ALBUTEROL SULFATE 2 PUFF: 90 AEROSOL, METERED RESPIRATORY (INHALATION) at 08:23

## 2020-04-22 RX ADMIN — ONDANSETRON 4 MG: 2 INJECTION INTRAMUSCULAR; INTRAVENOUS at 08:24

## 2020-04-22 ASSESSMENT — PULMONARY FUNCTION TESTS
PIF_VALUE: 19
PIF_VALUE: 21
PIF_VALUE: 1
PIF_VALUE: 27
PIF_VALUE: 1
PIF_VALUE: 3
PIF_VALUE: 1
PIF_VALUE: 2
PIF_VALUE: 2
PIF_VALUE: 3
PIF_VALUE: 1
PIF_VALUE: 3
PIF_VALUE: 1
PIF_VALUE: 1
PIF_VALUE: 37
PIF_VALUE: 33
PIF_VALUE: 1
PIF_VALUE: 19
PIF_VALUE: 4
PIF_VALUE: 3
PIF_VALUE: 1
PIF_VALUE: 2
PIF_VALUE: 3
PIF_VALUE: 1
PIF_VALUE: 19
PIF_VALUE: 19
PIF_VALUE: 9
PIF_VALUE: 18
PIF_VALUE: 1

## 2020-04-22 ASSESSMENT — PAIN SCALES - GENERAL
PAINLEVEL_OUTOF10: 0
PAINLEVEL_OUTOF10: 0

## 2020-04-22 ASSESSMENT — PAIN - FUNCTIONAL ASSESSMENT: PAIN_FUNCTIONAL_ASSESSMENT: 0-10

## 2020-04-22 NOTE — PROGRESS NOTES
0900 - transferred from endoscopy, monitor applied, alarms on and verified, bedside handoff provided by Be[facility] Canavan and [address] [facility]. [address] [address] - patient coughing persistently, anesthesia notified, orders received, respiratory therapy called and updated; patient states he's had a persistent cough for up to 4 months.   9685 - RT present and providing 4% lidocaine aerosol treatment  0943 - phase one care complete, transferred to [place] [id number] on cart  0945 - bedside handoff provided to [facility]; patient instructed not to eat or drink for two hours
1025 PT DISCHARGED TO HOME PER WHEELCHAIR TO PRIVATE VEHICLE DRIVEN BY FRIEND.
8398 Pt received from PACU and report received from [facility] (LEONILA BYERS). PT denies c/o and eager to be discharge to go home. Pt instructed to not eat or drink until 1200. Pt up to side of bed with assist. Pt tolerated well and ready to get dressed to go home. Call light in reach.
rec'd report from [address] [place] [address] in [facility].
shower the night before or morning of your procedure, do not apply any lotion, oil or powder.

## 2020-04-22 NOTE — BRIEF OP NOTE
BRIEF EGD REPORT:     Photos and full EGD report available by going to [facility] review\" then \"procedures\" then  \"EGD\" then \"View Endoscopy Report\"     IMPRESSION :    1) small hiatal hernia   2) esophageal ring at the SC junction- dilated as described- K31.83   3) otherwise norrmal exam    PLAN : repeat esophageal dilation as needed

## 2020-07-09 ENCOUNTER — HOSPITAL ENCOUNTER (EMERGENCY)
Age: 65
Discharge: HOME | End: 2020-07-09
Attending: EMERGENCY MEDICINE
Payer: MEDICARE

## 2020-07-09 VITALS
BODY MASS INDEX: 35.79 KG/M2 | HEIGHT: 70 IN | HEART RATE: 98 BPM | OXYGEN SATURATION: 99 % | TEMPERATURE: 98.3 F | DIASTOLIC BLOOD PRESSURE: 82 MMHG | RESPIRATION RATE: 18 BRPM | SYSTOLIC BLOOD PRESSURE: 162 MMHG | WEIGHT: 250 LBS

## 2020-07-09 LAB
ALBUMIN SERPL-MCNC: 4.8 GM/DL (ref 3.4–5)
ALCOHOL SCREEN SERUM: 0.07 %WT/VOL
ALP BLD-CCNC: 177 IU/L (ref 40–128)
ALT SERPL-CCNC: 61 U/L (ref 10–40)
AMPHETAMINES: NEGATIVE
ANION GAP SERPL CALCULATED.3IONS-SCNC: 17 MMOL/L (ref 4–16)
AST SERPL-CCNC: 144 IU/L (ref 15–37)
BARBITURATE SCREEN URINE: NEGATIVE
BASOPHILS ABSOLUTE: 0 K/CU MM
BASOPHILS RELATIVE PERCENT: 0.4 % (ref 0–1)
BENZODIAZEPINE SCREEN, URINE: NEGATIVE
BILIRUB SERPL-MCNC: 1.1 MG/DL (ref 0–1)
BUN BLDV-MCNC: 9 MG/DL (ref 6–23)
CALCIUM SERPL-MCNC: 9.5 MG/DL (ref 8.3–10.6)
CANNABINOID SCREEN URINE: NEGATIVE
CHLORIDE BLD-SCNC: 94 MMOL/L (ref 99–110)
CO2: 19 MMOL/L (ref 21–32)
COCAINE METABOLITE: NEGATIVE
CREAT SERPL-MCNC: 0.8 MG/DL (ref 0.9–1.3)
DIFFERENTIAL TYPE: ABNORMAL
EOSINOPHILS ABSOLUTE: 0 K/CU MM
EOSINOPHILS RELATIVE PERCENT: 0.7 % (ref 0–3)
GFR AFRICAN AMERICAN: >60 ML/MIN/1.73M2
GFR NON-AFRICAN AMERICAN: >60 ML/MIN/1.73M2
GLUCOSE BLD-MCNC: 89 MG/DL (ref 70–99)
HCT VFR BLD CALC: 44.9 % (ref 42–52)
HEMOGLOBIN: 15.5 GM/DL (ref 13.5–18)
IMMATURE NEUTROPHIL %: 0 % (ref 0–0.43)
LIPASE: 25 IU/L (ref 13–60)
LYMPHOCYTES ABSOLUTE: 1.9 K/CU MM
LYMPHOCYTES RELATIVE PERCENT: 42.4 % (ref 24–44)
MCH RBC QN AUTO: 33 PG (ref 27–31)
MCHC RBC AUTO-ENTMCNC: 34.5 % (ref 32–36)
MCV RBC AUTO: 95.5 FL (ref 78–100)
MONOCYTES ABSOLUTE: 0.6 K/CU MM
MONOCYTES RELATIVE PERCENT: 13.8 % (ref 0–4)
NUCLEATED RBC %: 0 %
OPIATES, URINE: NEGATIVE
OXYCODONE: NEGATIVE
PDW BLD-RTO: 13.9 % (ref 11.7–14.9)
PHENCYCLIDINE, URINE: NEGATIVE
PLATELET # BLD: 223 K/CU MM (ref 140–440)
PMV BLD AUTO: 9.4 FL (ref 7.5–11.1)
POTASSIUM SERPL-SCNC: 3.4 MMOL/L (ref 3.5–5.1)
RBC # BLD: 4.7 M/CU MM (ref 4.6–6.2)
SEGMENTED NEUTROPHILS ABSOLUTE COUNT: 1.9 K/CU MM
SEGMENTED NEUTROPHILS RELATIVE PERCENT: 42.7 % (ref 36–66)
SODIUM BLD-SCNC: 130 MMOL/L (ref 135–145)
TOTAL IMMATURE NEUTOROPHIL: 0 K/CU MM
TOTAL NUCLEATED RBC: 0 K/CU MM
TOTAL PROTEIN: 8.3 GM/DL (ref 6.4–8.2)
WBC # BLD: 4.5 K/CU MM (ref 4–10.5)

## 2020-07-09 PROCEDURE — 36415 COLL VENOUS BLD VENIPUNCTURE: CPT

## 2020-07-09 PROCEDURE — 85025 COMPLETE CBC W/AUTO DIFF WBC: CPT

## 2020-07-09 PROCEDURE — 6370000000 HC RX 637 (ALT 250 FOR IP): Performed by: EMERGENCY MEDICINE

## 2020-07-09 PROCEDURE — 83690 ASSAY OF LIPASE: CPT

## 2020-07-09 PROCEDURE — 80053 COMPREHEN METABOLIC PANEL: CPT

## 2020-07-09 PROCEDURE — 99284 EMERGENCY DEPT VISIT MOD MDM: CPT

## 2020-07-09 PROCEDURE — G0480 DRUG TEST DEF 1-7 CLASSES: HCPCS

## 2020-07-09 PROCEDURE — 80307 DRUG TEST PRSMV CHEM ANLYZR: CPT

## 2020-07-09 RX ORDER — ONDANSETRON 4 MG/1
4 TABLET, ORALLY DISINTEGRATING ORAL ONCE
Status: COMPLETED | OUTPATIENT
Start: 2020-07-09 | End: 2020-07-09

## 2020-07-09 RX ADMIN — ONDANSETRON 4 MG: 4 TABLET, ORALLY DISINTEGRATING ORAL at 17:46

## 2020-07-09 NOTE — ED PROVIDER NOTES
EMERGENCY DEPARTMENT ENCOUNTER    Patient: Jaye Martinez  MRN: [id number]  : 1955  Date of Evaluation: 2020  ED Provider:  Gino Ruiz    CHIEF COMPLAINT  Chief Complaint   Patient presents with    Alcohol Problem     [facility] detox, last drink noon states had 5 beers       HPI  Jaye Martinez is a 59 y.o. male who presents seeking help with his alcoholism. States he wants to get clean from alcohol. He states he drinks about 10-12 beers per day and has had 5 beers today. Last drink about 4 hours prior to me seeing him at around 1 PM.  He states he  had some mild to moderate abdominal cramping last night but denies any at this present time. Denies tremors, denies hallucinations. Denies seizure. Denies any other associated symptoms or complaints or concerns.       REVIEW OF SYSTEMS    Constitutional: negative for fever, chills  Neurological: negative for HA, focal weakness, loss of sensation  Ophthalmic: negative for vision change  ENT: negative for congestion, rhinorrhea  Cardiovascular: negative for chest pain  Respiratory: negative for SOB, cough  GI: negative for abdominal pain, nausea, vomiting, diarrhea, constipation  : negative for dysuria, hematuria  Musculoskeletal: negative for myalgias, decreased ROM, joint swelling  Dermatological: negative for rash, wounds  Heme: Negative for bleeding, bruising      PAST MEDICAL HISTORY  Past Medical History:   Diagnosis Date    Asthma     Chronic cough 2020    COPD, mild ([address] 2019    Follows with Dr. Elisa Zheng GERD (gastroesophageal reflux disease)     Hypertension     Murmur, heart     Follows with PCP    SHAHNAZ on CPAP     Psychiatric problem     Shortness of breath 2020    Tobacco abuse 2020       CURRENT MEDICATIONS  [unfilled]    ALLERGIES  Allergies   Allergen Reactions    Codeine Itching       SURGICAL HISTORY  Past Surgical History:   Procedure Laterality Date    CARDIAC CATHETERIZATION      No stents per pt  COLONOSCOPY  2015    Polypectomy - [place]    KNEE ARTHROSCOPY Left Last: 2004    x2  - cleaned up    UPPER GASTROINTESTINAL ENDOSCOPY N/A 4/22/2020    EGD ESOPHAGOGASTRODUODENOSCOPY DILATATION 18-20 performed by Alyssa Moy MD at [facility] ENDOSCOPY       FAMILY HISTORY  Family History   Problem Relation Age of Onset    Cancer Brother        SOCIAL HISTORY  Social History     Socioeconomic History    Marital status: Single     Spouse name: None    Number of children: 2    Years of education: None    Highest education level: None   Occupational History    None   Social Needs    Financial resource strain: None    Food insecurity     Worry: None     Inability: None    Transportation needs     Medical: None     Non-medical: None   Tobacco Use    Smoking status: Current Every Day Smoker     Packs/day: 0.75     Years: 50.00     Pack years: 37.50     Types: Cigarettes     Start date: 1970    Smokeless tobacco: Never Used    Tobacco comment: states only smoke 12 a day   Substance and Sexual Activity    Alcohol use:  Yes     Alcohol/week: 8.0 standard drinks     Types: 8 Cans of beer per week     Comment: half pint of vodka every day - stopped  3/2020 per pt    Drug use: No    Sexual activity: Never     Partners: Female   Lifestyle    Physical activity     Days per week: None     Minutes per session: None    Stress: None   Relationships    Social connections     Talks on phone: None     Gets together: None     Attends Quaker service: None     Active member of club or organization: None     Attends meetings of clubs or organizations: None     Relationship status: None    Intimate partner violence     Fear of current or ex partner: None     Emotionally abused: None     Physically abused: None     Forced sexual activity: None   Other Topics Concern    None   Social History Narrative    None         **Past medical, family and social histories, and nursing notes reviewed and verified by me**      PHYSICAL EXAM  VITAL SIGNS:   ED Triage Vitals   Enc Vitals Group      BP 07/09/20 1529 (!) 145/97      Pulse 07/09/20 1529 72      Resp 07/09/20 1529 18      Temp 07/09/20 1528 98.2 °F (36.8 °C)      Temp Source 07/09/20 1528 Oral      SpO2 07/09/20 1529 97 %      Weight --       Height --       Head Circumference --       Peak Flow --       Pain Score --       Pain Loc --       Pain Edu? --       Excl. in [address] during ED course were reviewed and are as charted. Constitutional: Minimal distress, Non-toxic appearance  Eyes: Conjunctiva normal, No discharge, PERRL, EOMI  HENT: Normocephalic, Atraumatic, bilateral external ears normal, posterior oropharynx is nonerythematous and without exudate, uvula is midline, no trismus, no \"hot potato voice\" or dysphonia, oropharynx moist  Neck: Supple, no stridor, no grossly visible or palpable masses  Cardiovascular: Regular rate and rhythm, No murmurs, No rubs, No gallops  Pulmonary/Chest: Normal breath sounds, No respiratory distress or accessory muscle use, No wheezing, crackles or rhonchi. Abdomen: Soft, nondistended and nonrigid, No tenderness or peritoneal signs, No masses, normal bowel sounds  Back: No midline point tenderness, No paraspinous muscle tenderness.  No CVA tenderness  Extremities: No gross deformities, no edema, no tenderness  Neurologic: Annual nerves II through XII are grossly intact as tested, normal motor function, Normal sensory function, No focal deficits  Skin: Warm, Dry, No erythema, No rash, No cyanosis, No mottling  Lymphatic: No lymphadenopathy in the following location(s): cervical  Psychiatric: Alert and oriented x3, Affect normal        RADIOLOGY/PROCEDURES/LABS/MEDICATIONS ADMINISTERED:    I have reviewed and interpreted all of the currently available lab results from this visit (if applicable):  Results for orders placed or performed during the hospital encounter of 07/09/20   CBC with Auto Diff   Result Value Ref Range    WBC 4.5 4.0 - 10.5 K/CU MM    RBC 4.70 4.6 - 6.2 M/CU MM    Hemoglobin 15.5 13.5 - 18.0 GM/DL    Hematocrit 44.9 42 - 52 %    MCV 95.5 78 - 100 FL    MCH 33.0 (H) 27 - 31 PG    MCHC 34.5 32.0 - 36.0 %    RDW 13.9 11.7 - 14.9 %    Platelets 323 129 - 299 K/CU MM    MPV 9.4 7.5 - 11.1 FL    Differential Type AUTOMATED DIFFERENTIAL     Segs Relative 42.7 36 - 66 %    Lymphocytes % 42.4 24 - 44 %    Monocytes % 13.8 (H) 0 - 4 %    Eosinophils % 0.7 0 - 3 %    Basophils % 0.4 0 - 1 %    Segs Absolute 1.9 K/CU MM    Lymphocytes Absolute 1.9 K/CU MM    Monocytes Absolute 0.6 K/CU MM    Eosinophils Absolute 0.0 K/CU MM    Basophils Absolute 0.0 K/CU MM    Nucleated RBC % 0.0 %    Total Nucleated RBC 0.0 K/CU MM    Total Immature Neutrophil 0.00 K/CU MM    Immature Neutrophil % 0.0 0 - 0.43 %   CMP   Result Value Ref Range    Sodium 130 (L) 135 - 145 MMOL/L    Potassium 3.4 (L) 3.5 - 5.1 MMOL/L    Chloride 94 (L) 99 - 110 mMol/L    CO2 19 (L) 21 - 32 MMOL/L    BUN 9 6 - 23 MG/DL    CREATININE 0.8 (L) 0.9 - 1.3 MG/DL    Glucose 89 70 - 99 MG/DL    Calcium 9.5 8.3 - 10.6 MG/DL    Alb 4.8 3.4 - 5.0 GM/DL    Total Protein 8.3 (H) 6.4 - 8.2 GM/DL    Total Bilirubin 1.1 (H) 0.0 - 1.0 MG/DL    ALT 61 (H) 10 - 40 U/L     (H) 15 - 37 IU/L    Alkaline Phosphatase 177 (H) 40 - 128 IU/L    GFR Non-African American >60 >60 mL/min/1.73m2    GFR African American >60 >60 mL/min/1.73m2    Anion Gap 17 (H) 4 - 16   Lipase   Result Value Ref Range    Lipase 25 13 - 60 IU/L   Ethanol Level   Result Value Ref Range    Alcohol Scrn 0.07 (H) <0.01 %WT/VOL   Urine Drug Screen   Result Value Ref Range    Cannabinoid Scrn, Ur NEGATIVE NEGATIVE    Amphetamines NEGATIVE NEGATIVE    Cocaine Metabolite NEGATIVE NEGATIVE    Benzodiazepine Screen, Urine NEGATIVE NEGATIVE    Barbiturate Screen, Ur NEGATIVE NEGATIVE    Opiates, Urine NEGATIVE NEGATIVE    Phencyclidine, Urine NEGATIVE NEGATIVE    Oxycodone NEGATIVE NEGATIVE          ABNORMAL LABS:  Labs Reviewed   CBC WITH AUTO DIFFERENTIAL - Abnormal; Notable for the following components:       Result Value    MCH 33.0 (*)     Monocytes % 13.8 (*)     All other components within normal limits   COMPREHENSIVE METABOLIC PANEL - Abnormal; Notable for the following components:    Sodium 130 (*)     Potassium 3.4 (*)     Chloride 94 (*)     CO2 19 (*)     CREATININE 0.8 (*)     Total Protein 8.3 (*)     Total Bilirubin 1.1 (*)     ALT 61 (*)      (*)     Alkaline Phosphatase 177 (*)     Anion Gap 17 (*)     All other components within normal limits   ETHANOL - Abnormal; Notable for the following components:    Alcohol Scrn 0.07 (*)     All other components within normal limits   LIPASE   URINE DRUG SCREEN         IMAGING STUDIES ORDERED:  IP CONSULT TO SOCIAL WORK  ED NURSING COMMUNICATION      No orders to display         MEDICATIONS ADMINISTERED:  Medications   ondansetron (ZOFRAN-ODT) disintegrating tablet 4 mg (4 mg Oral Given 7/9/20 1746)         COURSE & MEDICAL DECISION MAKING  Last vitals: BP (!) 176/101   Pulse 72   Temp 98.4 °F (36.9 °C) (Oral)   Resp 18   Ht 5' 10\" (1.778 m)   Wt 250 lb (113.4 kg)   SpO2 98%   BMI 35.87 kg/m²     66-year-old male history of alcohol abuse. He is seeking help with his alcohol abuse. Denies symptoms. No evidence to suggest alcohol withdrawal.    Case management was consulted and has met with the patient. They found a detox center for him to go to. He is going to be transferred there shortly. Additional workup and treatment in the ED as documented above. Patient reassured and will be discharged to detox center. I have explained to the patient in appropriate terminology our work-up in the ED and their diagnosis. I have also given anticipatory guidance and expectant management of their condition as an outpatient as per my custom.  The patient was given clear discharge and follow-up instructions including return to the ER immediately for worsening concerns. The patient has been advised to follow-up with their primary care physician and/or referred physician in the next two to three days or sooner if worsening and to return to the ER immediately as above with any concerns. I provided the patient counseling with regard to my customary list of strict return precautions as well as return precautions specific to the cause for today's emergency department visit. The patient will return under these provided conditions, but should also return for new concerns or further worsening. Pt and/or family understand and agree with plan. Clinical Impression:  1. Alcohol abuse        Disposition referral (if applicable): DIANA Taveras CNP  [facility] [facility] [phone]  [address] [facility]  [place] [id number] [phone]    If symptoms worsen      Disposition medications (if applicable):  New Prescriptions    No medications on file       ED Provider Disposition Time  DISPOSITION            Electronically signed by: Rosevelt Sever, M.D., 7/9/2020 10:10 PM      This dictation was created with voice recognition software. While attempts have been made to review the dictation as it is transcribed, on occasion the spoken word can be misinterpreted by the technology leading to omissions or inappropriate words, phrases or sentences.         Oliver Sicard, MD  07/09/20 0387

## 2020-07-09 NOTE — ED PROVIDER NOTES
As physician-in-triage, I performed a medical screening history and physical exam on this patient. HISTORY OF PRESENT ILLNESS  Virginie Acevedo is a 59 y.o. male presents to the emergency department stating he is looking for alcohol detox. States he drinks 12-16 beers a day. States he had approximately 5 beers today about 2 hours ago. States he has some abdominal pain some dry heaving. No chest pain or pressure. States he has been to rehab a few times in the past, last time was approximately 2 years ago. States he is sick of living like this and would like some help with his alcohol problem. States he does smoke cigarettes but denies any drug use. Susana Bowles PHYSICAL EXAM  BP (!) 145/97   Pulse 72   Temp 98.2 °F (36.8 °C) (Oral)   Resp 18   SpO2 97%     On exam, the patient appears in no acute distress. Speech is clear. Breathing is unlabored. Moves all extremities    Comment: Please note this report has been produced using speech recognition software and may contain errors related to that system including errors in grammar, punctuation, and spelling, as well as words and phrases that may be inappropriate. If there are any questions or concerns please feel free to contact the dictating provider for clarification.        Jose M Anderson MD  07/09/20 0425

## 2020-07-09 NOTE — CARE COORDINATION
CM received a consult for patient. CM went to speak with attending physician, \"Dr. Kristen Walker. \" Attending physician requested that CM wait until physician speaks with patient to ensure that patient is not going through active withdrawal and then physician will call CM. Physician came and requested CM to see patient as patient is able to be seen and not going through alcohol withdrawal at this time. 18:15 CM saw patient in room. Patient reported that he lives by self in apartment and does not have any family or friends that can assist him at this time. Patient reports that he has a problem drinking and would like some help with not drinking, but he will need to stay in the hospital to go through withdrawal since the hospital can medicate patient during this period. Patient shared with CM that he drinks 10 - 12 beers a day. Patient reports that has already had 5 beers at one o'clock. CM spoke to patients' attending emergency room physician and shared with physician what the patient would like to do. Physician explained that he had already talked with patient and explained this. CM then went back and spoke with patient. Patient asked if he could be discharged in order that he could go outside and smoke a cigarette. CM reported that it was a non-smoking facility and that he would not be able to be discharged in order to smoke. Patient then reported that he may just as well leave AMA. CM explained to patient that if patient did this; his emergency room bill could not get covered by his insurance. Patient then reported that he would stay but would like CM to find him a treatment facility. CM then called [facility] @ [phone]. Chhaya Overton returned call and found placement for patient at [facility] @ [address] [place] [address]. CM shared information with patient. Patient in agreement. CM then set up transportation through [employer] @ [phone].  Radha Rodriguez will  patient at 10:45 pm and

## 2020-07-10 NOTE — ED NOTES
Social work stated pt will being going to [facility] at 2300- I asked social work if a covid swab is needed, she said she will check dmp hector Ferrara RN  07/09/20 2040

## 2020-08-25 RX ORDER — OMEPRAZOLE 40 MG/1
40 CAPSULE, DELAYED RELEASE ORAL
Qty: 90 CAPSULE | Refills: 1 | Status: SHIPPED | OUTPATIENT
Start: 2020-08-25

## 2020-09-10 ENCOUNTER — OFFICE VISIT (OUTPATIENT)
Dept: PULMONOLOGY | Age: 65
End: 2020-09-10
Payer: MEDICARE

## 2020-09-10 VITALS
HEART RATE: 60 BPM | DIASTOLIC BLOOD PRESSURE: 78 MMHG | WEIGHT: 240 LBS | HEIGHT: 70 IN | BODY MASS INDEX: 34.36 KG/M2 | RESPIRATION RATE: 14 BRPM | OXYGEN SATURATION: 97 % | SYSTOLIC BLOOD PRESSURE: 138 MMHG

## 2020-09-10 PROCEDURE — 4004F PT TOBACCO SCREEN RCVD TLK: CPT | Performed by: INTERNAL MEDICINE

## 2020-09-10 PROCEDURE — 4040F PNEUMOC VAC/ADMIN/RCVD: CPT | Performed by: INTERNAL MEDICINE

## 2020-09-10 PROCEDURE — G8926 SPIRO NO PERF OR DOC: HCPCS | Performed by: INTERNAL MEDICINE

## 2020-09-10 PROCEDURE — 3023F SPIROM DOC REV: CPT | Performed by: INTERNAL MEDICINE

## 2020-09-10 PROCEDURE — 99213 OFFICE O/P EST LOW 20 MIN: CPT | Performed by: INTERNAL MEDICINE

## 2020-09-10 PROCEDURE — 3017F COLORECTAL CA SCREEN DOC REV: CPT | Performed by: INTERNAL MEDICINE

## 2020-09-10 PROCEDURE — G8427 DOCREV CUR MEDS BY ELIG CLIN: HCPCS | Performed by: INTERNAL MEDICINE

## 2020-09-10 PROCEDURE — G8417 CALC BMI ABV UP PARAM F/U: HCPCS | Performed by: INTERNAL MEDICINE

## 2020-09-10 PROCEDURE — 1123F ACP DISCUSS/DSCN MKR DOCD: CPT | Performed by: INTERNAL MEDICINE

## 2020-10-15 ENCOUNTER — TELEPHONE (OUTPATIENT)
Dept: PULMONOLOGY | Age: 65
End: 2020-10-15

## 2021-03-11 ENCOUNTER — PROCEDURE VISIT (OUTPATIENT)
Dept: PULMONOLOGY | Age: 66
End: 2021-03-11
Payer: MEDICARE

## 2021-03-11 DIAGNOSIS — J44.9 COPD, MILD (HCC): Primary | ICD-10-CM

## 2021-03-11 DIAGNOSIS — R06.02 SHORTNESS OF BREATH: ICD-10-CM

## 2021-03-11 DIAGNOSIS — J44.9 CHRONIC OBSTRUCTIVE PULMONARY DISEASE, UNSPECIFIED COPD TYPE (HCC): Primary | ICD-10-CM

## 2021-03-11 DIAGNOSIS — Z72.0 TOBACCO ABUSE: ICD-10-CM

## 2021-03-11 DIAGNOSIS — G47.33 OSA ON CPAP: ICD-10-CM

## 2021-03-11 DIAGNOSIS — R91.8 LUNG INFILTRATE ON CT: ICD-10-CM

## 2021-03-11 DIAGNOSIS — Z99.89 OSA ON CPAP: ICD-10-CM

## 2021-03-11 LAB
EXPIRATORY TIME-POST: NORMAL
EXPIRATORY TIME: NORMAL
FEF 25-75% %CHNG: NORMAL
FEF 25-75% %PRED-POST: NORMAL
FEF 25-75% %PRED-PRE: NORMAL
FEF 25-75% PRED: NORMAL
FEF 25-75%-POST: NORMAL
FEF 25-75%-PRE: NORMAL
FEV1 %PRED-POST: 92.5 %
FEV1 %PRED-PRE: 87.3 %
FEV1 PRED: 3.01 L
FEV1-POST: 2.78 L
FEV1-PRE: 2.63 L
FEV1/FVC %PRED-POST: 103 %
FEV1/FVC %PRED-PRE: 102 %
FEV1/FVC PRED: 77.3 %
FEV1/FVC-POST: 79.6 %
FEV1/FVC-PRE: 78.8 %
FVC %PRED-POST: 89.1 L
FVC %PRED-PRE: 84.9 %
FVC PRED: 3.93 L
FVC-POST: 3.5 L
FVC-PRE: 3.33 L
PEF %PRED-POST: NORMAL
PEF %PRED-PRE: NORMAL
PEF PRED: NORMAL
PEF%CHNG: NORMAL
PEF-POST: NORMAL
PEF-PRE: NORMAL

## 2021-03-11 PROCEDURE — G8926 SPIRO NO PERF OR DOC: HCPCS | Performed by: INTERNAL MEDICINE

## 2021-03-11 PROCEDURE — G8427 DOCREV CUR MEDS BY ELIG CLIN: HCPCS | Performed by: INTERNAL MEDICINE

## 2021-03-11 PROCEDURE — G8417 CALC BMI ABV UP PARAM F/U: HCPCS | Performed by: INTERNAL MEDICINE

## 2021-03-11 PROCEDURE — 3023F SPIROM DOC REV: CPT | Performed by: INTERNAL MEDICINE

## 2021-03-11 PROCEDURE — 99213 OFFICE O/P EST LOW 20 MIN: CPT | Performed by: INTERNAL MEDICINE

## 2021-03-11 PROCEDURE — 1123F ACP DISCUSS/DSCN MKR DOCD: CPT | Performed by: INTERNAL MEDICINE

## 2021-03-11 PROCEDURE — 3017F COLORECTAL CA SCREEN DOC REV: CPT | Performed by: INTERNAL MEDICINE

## 2021-03-11 PROCEDURE — 4004F PT TOBACCO SCREEN RCVD TLK: CPT | Performed by: INTERNAL MEDICINE

## 2021-03-11 PROCEDURE — G8484 FLU IMMUNIZE NO ADMIN: HCPCS | Performed by: INTERNAL MEDICINE

## 2021-03-11 PROCEDURE — 4040F PNEUMOC VAC/ADMIN/RCVD: CPT | Performed by: INTERNAL MEDICINE

## 2021-03-11 ASSESSMENT — PULMONARY FUNCTION TESTS
FEV1/FVC_POST: 79.6
FEV1/FVC_PERCENT_PREDICTED_PRE: 102.0
FEV1/FVC_PREDICTED: 77.3
FEV1_PREDICTED: 3.01
FVC_PERCENT_PREDICTED_POST: 89.1
FEV1_POST: 2.78
FVC_POST: 3.50
FEV1_PRE: 2.63
FEV1/FVC_PRE: 78.8
FVC_PERCENT_PREDICTED_PRE: 84.9
FVC_PRE: 3.33
FEV1_PERCENT_PREDICTED_POST: 92.5
FEV1_PERCENT_PREDICTED_PRE: 87.3
FVC_PREDICTED: 3.93
FEV1/FVC_PERCENT_PREDICTED_POST: 103.0

## 2021-03-11 NOTE — PROGRESS NOTES
CHIEF COMPLIANT:  Bipin Reyes presents to the pulmonary clinic today for evaluation, spirometry testing, review of testing results and pulmonary medications. He major complaint today is mild COPD, obstructive sleep apnea on CPAP, right upper lobe lung infiltrate    HPI: . [employer] [facility] [employer] states that he started smoking again after his sister's death. He also mentions that he has been drinking. He denies worsening shortness of breath or chest discomfort and has had no recent bronchitic infections. He continues on Breo, Incruse and albuterol rescue for his COPD. He also states that he he is not wearing his CPAP equipment. He has had no known COVID-19 exposure or infection and states that he is planning on getting the Covid vaccine in several weeks. Physical Exam:  Constitutional:  He appears well developed and well-nourished. Respiratory: No acute respiratory distress noted  Neurologic: Oriented x3, normal speech    OFFICE SPIROMETRY:  Bipin Reyes demonstrates an FEV1 of 2.63 L with an FVC of 3.33 liters. He demonstrates no significant obstructive lung defect. He shows no significant response to bronchodilators. Overall, his lung function has mildly improved over the past year. ASSESSMENT:    1. COPD, mild ([address]    2. Lung infiltrate on CT    3. SHAHNAZ on CPAP    4. Shortness of breath    5. Tobacco abuse          PLAN:   I would like to repeat his CT chest to follow-up on the right upper lobe groundglass infiltrate and mediastinal adenopathy noted. Once again I advised him to quit smoking soon as possible. I will make no change in his current bronchodilator therapy. I will continue to follow him    We have discussed the need to maintain yearly flu immunization, pneumococcal vaccination and coronavirus vaccination.  We have discussed Coronavirus precaution including handwashing practice, wiping items touched in public such as gas pumps, door handles, shopping carts, etc. Self monitoring for infection - fever, chills, cough, SOB. Should they develop symptoms they should call office for further instructions. Return in about 2 months (around 5/11/2021) for Recheck for COPD, Recheck for Shortness of Breath, recheck for lung infiltrate. This dictation was performed with a verbal recognition program and it was checked for errors. It is possible that there are still dictated errors within this office note. Any errors should be brought immediately to my attention for correction. All efforts were made to ensure that this office note is accurate.

## 2021-03-19 ENCOUNTER — HOSPITAL ENCOUNTER (OUTPATIENT)
Age: 66
Discharge: HOME | End: 2021-03-19
Payer: MEDICARE

## 2021-03-19 LAB
BUN BLDV-MCNC: 6 MG/DL (ref 6–23)
CREAT SERPL-MCNC: 0.8 MG/DL (ref 0.9–1.3)
GFR AFRICAN AMERICAN: >60 ML/MIN/1.73M2
GFR NON-AFRICAN AMERICAN: >60 ML/MIN/1.73M2

## 2021-03-19 PROCEDURE — 36415 COLL VENOUS BLD VENIPUNCTURE: CPT

## 2021-03-19 PROCEDURE — 84520 ASSAY OF UREA NITROGEN: CPT

## 2021-03-19 PROCEDURE — 82565 ASSAY OF CREATININE: CPT

## 2021-05-13 ENCOUNTER — HOSPITAL ENCOUNTER (OUTPATIENT)
Dept: CT IMAGING | Age: 66
Discharge: HOME | End: 2021-05-13
Payer: MEDICARE

## 2021-05-13 DIAGNOSIS — Z72.0 TOBACCO ABUSE: ICD-10-CM

## 2021-05-13 DIAGNOSIS — R91.8 LUNG INFILTRATE ON CT: ICD-10-CM

## 2021-05-13 LAB
GFR AFRICAN AMERICAN: >60 ML/MIN/1.73M2
GFR NON-AFRICAN AMERICAN: >60 ML/MIN/1.73M2
POC CREATININE: 0.7 MG/DL (ref 0.9–1.3)

## 2021-05-13 PROCEDURE — 71260 CT THORAX DX C+: CPT

## 2021-05-13 PROCEDURE — 2580000003 HC RX 258: Performed by: INTERNAL MEDICINE

## 2021-05-13 PROCEDURE — 6360000004 HC RX CONTRAST MEDICATION: Performed by: INTERNAL MEDICINE

## 2021-05-13 RX ORDER — SODIUM CHLORIDE 0.9 % (FLUSH) 0.9 %
10 SYRINGE (ML) INJECTION PRN
Status: DISCONTINUED | OUTPATIENT
Start: 2021-05-13 | End: 2021-05-14 | Stop reason: HOSPADM

## 2021-05-13 RX ADMIN — SODIUM CHLORIDE, PRESERVATIVE FREE 10 ML: 5 INJECTION INTRAVENOUS at 14:36

## 2021-05-13 RX ADMIN — IOPAMIDOL 75 ML: 755 INJECTION, SOLUTION INTRAVENOUS at 14:36

## 2021-05-20 ENCOUNTER — OFFICE VISIT (OUTPATIENT)
Dept: PULMONOLOGY | Age: 66
End: 2021-05-20
Payer: MEDICARE

## 2021-05-20 VITALS
HEIGHT: 70 IN | SYSTOLIC BLOOD PRESSURE: 122 MMHG | HEART RATE: 74 BPM | DIASTOLIC BLOOD PRESSURE: 80 MMHG | OXYGEN SATURATION: 98 % | WEIGHT: 230 LBS | BODY MASS INDEX: 32.93 KG/M2

## 2021-05-20 DIAGNOSIS — G47.33 OSA ON CPAP: ICD-10-CM

## 2021-05-20 DIAGNOSIS — J44.9 COPD, MILD (HCC): Primary | ICD-10-CM

## 2021-05-20 DIAGNOSIS — R06.02 SHORTNESS OF BREATH: ICD-10-CM

## 2021-05-20 DIAGNOSIS — Z99.89 OSA ON CPAP: ICD-10-CM

## 2021-05-20 DIAGNOSIS — Z72.0 TOBACCO ABUSE: ICD-10-CM

## 2021-05-20 PROCEDURE — G8427 DOCREV CUR MEDS BY ELIG CLIN: HCPCS | Performed by: INTERNAL MEDICINE

## 2021-05-20 PROCEDURE — G8417 CALC BMI ABV UP PARAM F/U: HCPCS | Performed by: INTERNAL MEDICINE

## 2021-05-20 PROCEDURE — G8926 SPIRO NO PERF OR DOC: HCPCS | Performed by: INTERNAL MEDICINE

## 2021-05-20 PROCEDURE — 4040F PNEUMOC VAC/ADMIN/RCVD: CPT | Performed by: INTERNAL MEDICINE

## 2021-05-20 PROCEDURE — 99213 OFFICE O/P EST LOW 20 MIN: CPT | Performed by: INTERNAL MEDICINE

## 2021-05-20 PROCEDURE — 4004F PT TOBACCO SCREEN RCVD TLK: CPT | Performed by: INTERNAL MEDICINE

## 2021-05-20 PROCEDURE — 3023F SPIROM DOC REV: CPT | Performed by: INTERNAL MEDICINE

## 2021-05-20 PROCEDURE — 3017F COLORECTAL CA SCREEN DOC REV: CPT | Performed by: INTERNAL MEDICINE

## 2021-05-20 PROCEDURE — 1123F ACP DISCUSS/DSCN MKR DOCD: CPT | Performed by: INTERNAL MEDICINE

## 2021-10-12 ENCOUNTER — HOSPITAL ENCOUNTER (OUTPATIENT)
Dept: PSYCHIATRY | Age: 66
Setting detail: THERAPIES SERIES
Discharge: HOME | End: 2021-10-12
Payer: MEDICARE

## 2021-10-12 PROCEDURE — 90791 PSYCH DIAGNOSTIC EVALUATION: CPT

## 2021-10-12 ASSESSMENT — PATIENT HEALTH QUESTIONNAIRE - PHQ9: SUM OF ALL RESPONSES TO PHQ QUESTIONS 1-9: 22

## 2021-10-12 ASSESSMENT — LIFESTYLE VARIABLES: HISTORY_ALCOHOL_USE: YES

## 2021-10-12 ASSESSMENT — ANXIETY QUESTIONNAIRES
7. FEELING AFRAID AS IF SOMETHING AWFUL MIGHT HAPPEN: 0
3. WORRYING TOO MUCH ABOUT DIFFERENT THINGS: 2
GAD7 TOTAL SCORE: 13
6. BECOMING EASILY ANNOYED OR IRRITABLE: 3
IF YOU CHECKED OFF ANY PROBLEMS ON THIS QUESTIONNAIRE, HOW DIFFICULT HAVE THESE PROBLEMS MADE IT FOR YOU TO DO YOUR WORK, TAKE CARE OF THINGS AT HOME, OR GET ALONG WITH OTHER PEOPLE: VERY DIFFICULT
4. TROUBLE RELAXING: 2
5. BEING SO RESTLESS THAT IT IS HARD TO SIT STILL: 2
1. FEELING NERVOUS, ANXIOUS, OR ON EDGE: 2
2. NOT BEING ABLE TO STOP OR CONTROL WORRYING: 2

## 2021-10-12 NOTE — PROGRESS NOTES
[facility]                Progress Note    [x] Brett  [] Tania brady                    Patient Name: Janki Mace   : 1955     Case # :  [id number]  [facility]        Objective/Service/Time:  ASSESSMENT/ALCOHOL-DETOX    1-Hour    S:  Client is a 68YO AASM who lives with his brother. He reports contacting the [address] [place] [address] inquirering about alcohol detox and they had given [facility]'s number to him. He reports drinking alcohol most of his life and currently drinks 14 24oz of beer in one setting. O:  Client was pleasant and fully oriented with an intact thought process. Speech was normal and appeared worried because he has had detox services before at [facility] and at [facility] in the past. Reports no current delusions, hallucinations, or SI/HI. A:  Completed Intake Paperwork, Psychosocial Assessment, and made referral to Carlos Davis who will also provide transportation for the client. P:  Client stated that he had to get some of his affairs in order before going and stated, \"I will contact PAW on Wednesday this week. [facility] staff will follow-up with client for transition.                   Michi Reece MA, [facility], , 8:27 PM

## 2021-10-12 NOTE — PROGRESS NOTES
[address] [place] [address] Urinalysis Laboratory Testing and Medical History Physician Order       Location: [x] [place] [] Claressa Feather D. Cara Bloch, MD., [id number], Medical Director of [facility] Director orders for [address] [place] [address] clinical therapists to collect an urine sample from the below patient,  and medical order for placement in level of care documented on  [facility] 157 scanned order. Client: Rosy Lange   : 1955   Case#  [id number]    Urine sample will be collected following the collections guidelines provided on Clinical Reference Laboratory [facility]) custody form, and completion of the  [facility] Non-Federal chain of custody drug screening form. During the course of treatment, randomly a urine sample will be collected, at a minimum of one time a month, more frequently as needed, as part of the clinical outpatient alcohol and drug treatment program at [address] [place] [address]. Medical care recommendation for clients experiencing/reporting  medical concerns, who do not have a family physician and willing to attend  medical care will be assisted in seeking medical care. Clinical providers will refer clients to local family physicians practices as part of the  clients treatment plan and assist the client in gaining access to an appointment. Release of information will be requested to support the  clients seeking medical care. Summary of Medical History  Prior to Admission medications    Medication Sig Start Date End Date Taking?  Authorizing Provider   omeprazole (PRILOSEC) 40 MG delayed release capsule Take 1 capsule by mouth every morning (before breakfast) 20   Tiffanie Lowe MD   Melatonin 5 MG CAPS Take 5 mg by mouth nightly    Historical Provider, MD   fluticasone (FLONASE) 50 MCG/ACT nasal spray SHAKE LIQUID AND USE 2 SPRAYS IN EACH NOSTRIL DAILY 20   Tiffanie Lowe MD   CPAP Machine MISC 6 cm by CPAP route nightly    Historical Provider, MD   INCRUSE ELLIPTA 62.5 MCG/INH AEPB INL 1 PUFF PO AT THE SAME TIME QD 2/13/19   Historical Provider, MD HERNANDEZ ELLIPTA 100-25 MCG/INH AEPB inhaler INL 1 PUFF PO AT THE SAME TIME QD 2/13/19   Historical Provider, MD   traZODone (DESYREL) 100 MG tablet Take 100 mg by mouth nightly 5/15/17   Historical Provider, MD   atorvastatin (LIPITOR) 40 MG tablet Take 40 mg by mouth nightly 5/24/17   Historical Provider, MD   doxazosin (CARDURA) 4 MG tablet Take 4 mg by mouth daily  2/14/18   Historical Provider, MD   metoprolol tartrate (LOPRESSOR) 25 MG tablet Take 25 mg by mouth 2 times daily 9/7/17   Historical Provider, MD   citalopram (CELEXA) 40 MG tablet Take 40 mg by mouth daily    Historical Provider, MD   albuterol sulfate  (90 Base) MCG/ACT inhaler Inhale 2 puffs into the lungs every 4 hours as needed 3/2/17   Historical Provider, MD   folic acid (FOLVITE) 1 MG tablet Take 1 mg by mouth daily 6/1/18   Historical Provider, MD   amLODIPine (NORVASC) 5 MG tablet Take 5 mg by mouth daily D 11/10/17   Historical Provider, MD   cetirizine (ZYRTEC) 10 MG tablet Take 10 mg by mouth daily 5/25/18   Historical Provider, MD   aspirin 81 MG chewable tablet Take 81 mg by mouth daily    Historical Provider, MD   Multiple Vitamins-Minerals (THERAPEUTIC MULTIVITAMIN-MINERALS) tablet Take 1 tablet by mouth daily    Historical Provider, MD     Past Surgical History:   Procedure Laterality Date   [address]  2008    No stents per pt    COLONOSCOPY  2015    Polypectomy - [place]    KNEE ARTHROSCOPY Left Last: 2004    x2  - cleaned up    UPPER GASTROINTESTINAL ENDOSCOPY N/A 4/22/2020    EGD ESOPHAGOGASTRODUODENOSCOPY DILATATION 18-20 performed by Debbie Horn MD at [facility]     Past Medical History:   Diagnosis Date    Asthma     Chronic cough 2/5/2020    COPD, mild ([address] 6/17/2019    Follows with Dr. Solomon Funez GERD (gastroesophageal reflux disease)     Hypertension     Lung infiltrate on CT 3/11/2021    Murmur, heart     Follows with

## 2021-10-12 NOTE — PROGRESS NOTES
[facility]                     CLINICAL DIAGNOSIS SUMMARY    Location: [x] [place] [] [place]                   Patient Name: Janki Mace   : 1955     Case # :  [id number]  Therapist: SHERMAN Bhatt      1. Identifying information:  Janki Mace / 1955:           Client is a 66YO AASM who lives with his brother. He reports contacting the [address] [facility] inquirering about alcohol detox and they had given [facility]'s number to him. He reports drinking alcohol most of his life and currently drinks 14 24oz of beer in one setting. 2.  Substance use history:  F10.20 Other and unspecified alcohol dependence/unspecified drinking behavior:         Onset of alcohol use age 15-teens beer 6 or 8 pk weekends; 20's-30's 4 or 5x weekly 12 pk; 40's-50's same; 60's increased to 14+ beers in one setting daily; Last use-10/12/21     3. Consequences of substance use: (personal, inter-personal, legal, occupational, medical, nutritional,       Leisure, spiritual, etc.):           Low Sober Support  Lives with Brother  On Social Security and Medicaid  COPD, Hypertension, GERD              4. Co-existing problems;  (mental health, psychiatric, previous treatment programs, family       Problems, social, educational, etc.):    Depression and Anxiety  Detox at [place] [address]          5. Treatment needs, barriers to treatment, impact of disease on life:         Detox and Residential Services  Physiological and Psychological Impact    Summary of Medical History:  Prior to Admission medications    Medication Sig Start Date End Date Taking?  Authorizing Provider   omeprazole (PRILOSEC) 40 MG delayed release capsule Take 1 capsule by mouth every morning (before breakfast) 20   Carolin Pittman MD   Melatonin 5 MG CAPS Take 5 mg by mouth nightly    Historical Provider, MD   fluticasone (FLONASE) 50 MCG/ACT cough 2/5/2020    COPD, [facility] [address] 6/17/2019    Follows with Dr. Telly Vital GERD (gastroesophageal reflux disease)     Hypertension     Lung infiltrate on CT 3/11/2021    Murmur, heart     Follows with PCP    SHAHNAZ on CPAP     Psychiatric problem     Shortness of breath 2/5/2020    Tobacco abuse 2/5/2020     Patient Active Problem List    Diagnosis Date Noted    Lung infiltrate on CT 03/11/2021    Tobacco abuse 02/05/2020    Shortness of breath 02/05/2020    Chronic cough 02/05/2020    COPD, mild ([facility] [address] 06/17/2019    SHAHNAZ on CPAP 06/17/2019    Alcohol withdrawal delirium ([facility] [address] 07/13/2018       6. Level of Care Determination:      4 Medically Managed Intensive Inpatient Services      7. Treatment available      ____yes   ___X__no         8.   Name of program referred:    ____[facility],    ____[facility],         ___X____other/identify Amelia Kinney at [address]    Electronically signed by Stephanie Sood [place] [address] on 90/79/7835 at 3:18 PM

## 2021-10-14 NOTE — PROGRESS NOTES
[facility]        Individual  Progress Note    Location: [x] [place] [] [place]                   Patient Name: Ge Ibarra   : 1955     Case # :  [id number]  Therapist: Padmini Toledo        Objective/Service/Time:     CASE MANAGEMENT    S:  I spoke with client about the [facility] at [facility]. He stated he made contact, but his type of Medicare insurance is not accepted. We discussed [facility]. Client stated that he plans to walk in to [facility] at 9 am tomorrow morning. O:  Client was oriented and open to discussion. A:  Client will arrive at [facility] tomorrow morning to complete walk in admission. P:  Client will contact [facility] at discharge, if he decides to participate in outpatient treatment.         Vevelyn Scheuermann, BS, [address] [facility] [address] [facility] [address], CTTS    Electronically signed by aPdmini Toledo on 10/14/2021 at 9:44 AM

## 2021-10-14 NOTE — PROGRESS NOTES
[address] [facility]        Individual  Progress Note    Location: [] [place] [] [place]                   Patient Name: Alexandra Newman   : 1955     Case # : [id number]  Therapist: Melvin Beyer        Objective/Service/Time:     CASE MANAGEMENT    S:  I met with client about detox options. He stated that he is not interested in residential after detox at this time. Client has Medicare. O:  Client was oriented and open to discussion. A:  Client was provided information to contact detox facilities. I spoke with Aman Walker, , at the [facility]. He stated they have medicare beds available, but client needs to call and complete prescreen and verify insurance. P:  Client will contact [facility]. Client will contact [facility] to discuss his plan for detox.       LINETTE Lowe, [address], CTTS      Electronically signed by Melvin Beyer on 10/14/2021 at 9:09 AM

## 2021-10-19 NOTE — PROGRESS NOTES
[facility]        Individual  Progress Note    Location: [x] [place] [] [place]                   Patient Name: Matheus Lange   : 1955     Case # :  [id number]  Therapist: Elvis Jameson        Objective/Service/Time:     CASE MANAGEMENT  10/19/21 2:30 pm    S:  I spoke with client on telephone. He stated that he has not yet gone to [facility]. He explained that he had a tooth pulled last week and also has a doctors appointment tomorrow. He stated that he plans to go on Thursday 10/21/21. O:  Client was oriented and open to discussion. A:  Client was encouraged to seek detox treatment. I again explained that [facility] could provide outpatient treatment after discharge. P:  [facility] will continue to follow up with client. Client will continue to speak with Therapist or  about requests for assistance if needed.       Coralie Crigler, BS, [address], CTTS      Electronically signed by Elvis Jameson on 10/19/2021 at 2:30 PM

## 2021-10-28 NOTE — PROGRESS NOTES
[address] [facility]        Individual  Progress Note    Location: [x] [place] [] [place]                   Patient Name: Andres Koehler   : 1955     Case # :  [id number]  Therapist: Pilar Lockwood        Objective/Service/Time:    CASE MANAGEMENT    S:  I spoke with client on telephone. He stated that he did not go to [facility] for detox because he was having car trouble. We discussed his plan. He is on the schedule at [facility] for tomorrow at 3 pm with Pedro Jones. He reportedly came into the office to ask about outpatient services and was placed on schedule. We discussed his continued need for detox. He asked about recieving detox outpatient and I explained that [facility] does not provide that service. We discussed the importance of receiving detox in a medical setting. Client explained that he is not currently having withdrawal symptoms, but he has in the past.      O:  Client was oriented and open to discussion. A:  Client and I discussed the possibility of seeking transportation through his insurance. P:  I will continue to seek resources for client. Client will continue to speak with Therapist or  about requests for assistance if needed.       LINETTE Pedersen, [address], CTTS      Electronically signed by Pilar Lockwood on 10/28/2021 at 2:49 PM

## 2021-10-29 ENCOUNTER — HOSPITAL ENCOUNTER (OUTPATIENT)
Dept: PSYCHIATRY | Age: 66
Setting detail: THERAPIES SERIES
Discharge: HOME | End: 2021-10-29
Payer: MEDICARE

## 2021-10-29 PROCEDURE — 90834 PSYTX W PT 45 MINUTES: CPT

## 2021-10-29 NOTE — PROGRESS NOTES
[facility]                     CLINICAL DIAGNOSIS SUMMARY/UPDATED    Location: [x] [place] [] [place]                   Patient Name: Flex Santiago   : 1955     Case # :  [id number]  [facility]      1. Identifying information:  Flex Santiago / 1955:           Client is a 66YO AASM who lives with his brother. He reports contacting the [address] [place] [address] inquirering about alcohol detox and they had given [facility] number to him. He reports drinking alcohol most of his life and currently drinks 14 24oz of beer in one setting. 2.  Substance use history:  F10.20 Other and unspecified alcohol dependence/unspecified drinking behavior:         Onset of alcohol use age 15-teens beer 6 or 8 pk weekends; 20's-30's 4 or 5x weekly 12 pk; 40's-50's same; 60's increased to 14+ beers in one setting daily; Last use-10/12/21     3. Consequences of substance use: (personal, inter-personal, legal, occupational, medical, nutritional,       Leisure, spiritual, etc.):           Low Sober Support  Lives with Brother  On Social Security and Medicaid  COPD, Hypertension, GERD              4. Co-existing problems;  (mental health, psychiatric, previous treatment programs, family       Problems, social, educational, etc.):    Depression and Anxiety  Detox at [place] [address]          5. Treatment needs, barriers to treatment, impact of disease on life:         Detox and Residential Services  Physiological and Psychological Impact    Summary of Medical History:  Prior to Admission medications    Medication Sig Start Date End Date Taking?  Authorizing Provider   omeprazole (PRILOSEC) 40 MG delayed release capsule Take 1 capsule by mouth every morning (before breakfast) 20   Scott Medrano MD   Melatonin 5 MG CAPS Take 5 mg by mouth nightly    Historical Provider, MD   fluticasone (FLONASE) 50 MCG/ACT nasal spray SHAKE LIQUID AND USE 2 SPRAYS IN EACH NOSTRIL DAILY 2/25/20   Lovely Masters MD   CPAP Machine MISC 6 cm by CPAP route nightly    Historical Provider, MD ISLAS ELLIPTA 62.5 MCG/INH AEPB INL 1 PUFF PO AT THE SAME TIME QD 2/13/19   Historical Provider, MD MARY CORDOVA 100-25 MCG/INH AEPB inhaler INL 1 PUFF PO AT THE SAME TIME QD 2/13/19   Historical Provider, MD   traZODone (DESYREL) 100 MG tablet Take 100 mg by mouth nightly 5/15/17   Historical Provider, MD   atorvastatin (LIPITOR) 40 MG tablet Take 40 mg by mouth nightly 5/24/17   Historical Provider, MD   doxazosin (CARDURA) 4 MG tablet Take 4 mg by mouth daily  2/14/18   Historical Provider, MD   metoprolol tartrate (LOPRESSOR) 25 MG tablet Take 25 mg by mouth 2 times daily 9/7/17   Historical Provider, MD   citalopram (CELEXA) 40 MG tablet Take 40 mg by mouth daily    Historical Provider, MD   albuterol sulfate  (90 Base) MCG/ACT inhaler Inhale 2 puffs into the lungs every 4 hours as needed 3/2/17   Historical Provider, MD   folic acid (FOLVITE) 1 MG tablet Take 1 mg by mouth daily 6/1/18   Historical Provider, MD   amLODIPine (NORVASC) 5 MG tablet Take 5 mg by mouth daily D 11/10/17   Historical Provider, MD   cetirizine (ZYRTEC) 10 MG tablet Take 10 mg by mouth daily 5/25/18   Historical Provider, MD   aspirin 81 MG chewable tablet Take 81 mg by mouth daily    Historical Provider, MD   Multiple Vitamins-Minerals (THERAPEUTIC MULTIVITAMIN-MINERALS) tablet Take 1 tablet by mouth daily    Historical Provider, MD     Past Surgical History:   Procedure Laterality Date    CARDIAC CATHETERIZATION  2008    No stents per pt    COLONOSCOPY  2015    Polypectomy - Phil    KNEE ARTHROSCOPY Left Last: 2004    x2  - cleaned up    UPPER GASTROINTESTINAL ENDOSCOPY N/A 4/22/2020    EGD ESOPHAGOGASTRODUODENOSCOPY DILATATION 18-20 performed by Polina Tong MD at [facility] ENDOSCOPY     Past Medical History:   Diagnosis Date    Asthma     Chronic cough 2/5/2020    COPD, mild ([facility] [address] 6/17/2019    Follows with Dr. Sergio Patel GERD (gastroesophageal reflux disease)     Hypertension     Lung infiltrate on CT 3/11/2021    Murmur, heart     Follows with PCP    SHAHNAZ on CPAP     Psychiatric problem     Shortness of breath 2/5/2020    Tobacco abuse 2/5/2020     Patient Active Problem List    Diagnosis Date Noted    Lung infiltrate on CT 03/11/2021    Tobacco abuse 02/05/2020    Shortness of breath 02/05/2020    Chronic cough 02/05/2020    COPD, mild ([facility] [address] 06/17/2019    SHAHNAZ on CPAP 06/17/2019    Alcohol withdrawal delirium ([facility] [address] 07/13/2018       6. Level of Care Determination:      4 Medically Managed Intensive Inpatient Services      7. Treatment available      ____yes   ___X__no         8.   Name of program referred:    ____[facility],    ____[facility],         ___X____other/identify Domo Rodriguez at [facility] and/or [address] [place] [address] [address]    Electronically signed by Stephanie Cardoza [place] [address] on 81/38/3482 at 2:54 PM

## 2022-05-11 ENCOUNTER — OFFICE VISIT (OUTPATIENT)
Dept: GASTROENTEROLOGY | Age: 67
End: 2022-05-11
Payer: MEDICARE

## 2022-05-11 VITALS
WEIGHT: 200.6 LBS | SYSTOLIC BLOOD PRESSURE: 116 MMHG | OXYGEN SATURATION: 93 % | TEMPERATURE: 98.6 F | HEART RATE: 63 BPM | HEIGHT: 70 IN | DIASTOLIC BLOOD PRESSURE: 78 MMHG | BODY MASS INDEX: 28.72 KG/M2

## 2022-05-11 DIAGNOSIS — R13.19 ESOPHAGEAL DYSPHAGIA: Primary | ICD-10-CM

## 2022-05-11 DIAGNOSIS — R74.8 ELEVATED LIVER ENZYMES: ICD-10-CM

## 2022-05-11 DIAGNOSIS — R19.7 DIARRHEA, UNSPECIFIED TYPE: ICD-10-CM

## 2022-05-11 DIAGNOSIS — R10.13 EPIGASTRIC DISCOMFORT: ICD-10-CM

## 2022-05-11 DIAGNOSIS — K21.9 GASTROESOPHAGEAL REFLUX DISEASE, UNSPECIFIED WHETHER ESOPHAGITIS PRESENT: ICD-10-CM

## 2022-05-11 PROCEDURE — G8417 CALC BMI ABV UP PARAM F/U: HCPCS | Performed by: NURSE PRACTITIONER

## 2022-05-11 PROCEDURE — G8427 DOCREV CUR MEDS BY ELIG CLIN: HCPCS | Performed by: NURSE PRACTITIONER

## 2022-05-11 PROCEDURE — 99204 OFFICE O/P NEW MOD 45 MIN: CPT | Performed by: NURSE PRACTITIONER

## 2022-05-11 PROCEDURE — 1123F ACP DISCUSS/DSCN MKR DOCD: CPT | Performed by: NURSE PRACTITIONER

## 2022-05-11 PROCEDURE — 3017F COLORECTAL CA SCREEN DOC REV: CPT | Performed by: NURSE PRACTITIONER

## 2022-05-11 PROCEDURE — 4004F PT TOBACCO SCREEN RCVD TLK: CPT | Performed by: NURSE PRACTITIONER

## 2022-05-11 PROCEDURE — 4040F PNEUMOC VAC/ADMIN/RCVD: CPT | Performed by: NURSE PRACTITIONER

## 2022-05-11 RX ORDER — IBUPROFEN 800 MG/1
TABLET ORAL
COMMUNITY
Start: 2022-05-09

## 2022-05-11 ASSESSMENT — ENCOUNTER SYMPTOMS
CONSTIPATION: 0
EYE PAIN: 0
DIARRHEA: 1
EYE DISCHARGE: 0
WHEEZING: 1
NAUSEA: 0
BACK PAIN: 1
COLOR CHANGE: 0
COUGH: 1
BLOOD IN STOOL: 0
ABDOMINAL PAIN: 0
SHORTNESS OF BREATH: 0
VOMITING: 0

## 2022-05-11 NOTE — PROGRESS NOTES
Sonia Rodríguez 77 y.o. male was seen by DORON Gilbert on 5/11/2022     Wt Readings from Last 3 Encounters:   05/11/22 200 lb 9.6 oz (91 kg)   05/20/21 230 lb (104.3 kg)   09/10/20 240 lb (108.9 kg)       STEPHEN Rodríguez is a pleasant 77 y.o.  male who presents today for acid reflux,dysphagia and elevated liver enzymes. He has a past medical history of asthma, chronic cough, COPD, mild, GERD (gastroesophageal reflux disease), hypertension, lung infiltrate on CT, murmur, heart, SHAHNAZ on CPAP, psychiatric problem, shortness of breath, and tobacco abuse. He smokes one pack of cigarettes per day. He drinks alcohol ten to twelve (12 ounce) beers daily for last year and a half. He takes Ibuprofen 800 mg twice daily on occasion with last dose Monday. He had colonoscopy five years ago and was told to return in 2025. His appetite is fair without early satiety. He eats one meal a day. His weight is down thirty pounds in the last year. No nausea or vomiting. No abdominal pain, bloating or distention. His heartburn and acid reflux has been off and on for two and half years. He stopped taking Prilosec four months ago. He denies typical symptoms of heartburn or acid reflux. No nocturnal awakenings with acid reflux. No current dysphagia. He had bread got stuck in upper esophagus and forced with water a few months ago. No problem swallowing since that time. No excess belching or flatulence. He denies changes in his bowel pattern. He mentioned having diarrhea from drinking beer. His bowels are moving eight to ten times daily with watery brown stools. No constipation. No blood in his stools or melena. No family history of stomach or colon cancer. ROS  Review of Systems   Constitutional: Positive for appetite change, fatigue and unexpected weight change. Negative for chills, diaphoresis and fever. HENT: Positive for tinnitus. Negative for ear pain and hearing loss.     Eyes: Negative for pain, discharge and visual disturbance. Respiratory: Positive for cough and wheezing. Negative for shortness of breath. Cardiovascular: Negative for chest pain, palpitations and leg swelling. Gastrointestinal: Positive for diarrhea. Negative for abdominal pain, blood in stool, constipation, nausea and vomiting. Endocrine: Negative for cold intolerance and heat intolerance. Genitourinary: Positive for frequency. Negative for dysuria, hematuria and urgency. Musculoskeletal: Positive for back pain. Negative for myalgias and neck pain. Skin: Negative for color change, pallor and rash. Allergic/Immunologic: Negative for environmental allergies and food allergies. Neurological: Negative for dizziness, seizures, weakness and headaches. Hematological: Does not bruise/bleed easily. Psychiatric/Behavioral: Positive for dysphoric mood and sleep disturbance. The patient is nervous/anxious.         Allergies  Allergies   Allergen Reactions    Codeine Itching       Medications  Current Outpatient Medications   Medication Sig Dispense Refill    Cyanocobalamin (VITAMIN B 12 PO) Take by mouth      ibuprofen (ADVIL;MOTRIN) 800 MG tablet TAKE 1 TABLET BY MOUTH THREE TIMES DAILY WITH FOOD      omeprazole (PRILOSEC) 40 MG delayed release capsule Take 1 capsule by mouth every morning (before breakfast) 90 capsule 1    Melatonin 5 MG CAPS Take 5 mg by mouth nightly      fluticasone (FLONASE) 50 MCG/ACT nasal spray SHAKE LIQUID AND USE 2 SPRAYS IN EACH NOSTRIL DAILY 3 Bottle 3    CPAP Machine MISC 6 cm by CPAP route nightly      INCRUSE ELLIPTA 62.5 MCG/INH AEPB INL 1 PUFF PO AT THE SAME TIME QD  2    BREO ELLIPTA 100-25 MCG/INH AEPB inhaler INL 1 PUFF PO AT THE SAME TIME QD  2    traZODone (DESYREL) 100 MG tablet Take 100 mg by mouth nightly      atorvastatin (LIPITOR) 40 MG tablet Take 40 mg by mouth nightly      doxazosin (CARDURA) 4 MG tablet Take 4 mg by mouth daily       metoprolol tartrate (LOPRESSOR) 25 MG tablet Take 25 mg by mouth 2 times daily      citalopram (CELEXA) 40 MG tablet Take 40 mg by mouth daily      albuterol sulfate  (90 Base) MCG/ACT inhaler Inhale 2 puffs into the lungs every 4 hours as needed      folic acid (FOLVITE) 1 MG tablet Take 1 mg by mouth daily      amLODIPine (NORVASC) 5 MG tablet Take 5 mg by mouth daily D      cetirizine (ZYRTEC) 10 MG tablet Take 10 mg by mouth daily      aspirin 81 MG chewable tablet Take 81 mg by mouth daily      Multiple Vitamins-Minerals (THERAPEUTIC MULTIVITAMIN-MINERALS) tablet Take 1 tablet by mouth daily       No current facility-administered medications for this visit. Past medical history:   He has a past medical history of Asthma, Chronic cough, COPD, mild ([address] GERD (gastroesophageal reflux disease), Hypertension, Lung infiltrate on CT, Murmur, heart, SHAHNAZ on CPAP, Psychiatric problem, Shortness of breath, and Tobacco abuse. Past surgical history:  He has a past surgical history that includes Knee arthroscopy (Left, Last: 2004); Colonoscopy (2015); Cardiac catheterization (2008); and Upper gastrointestinal endoscopy (N/A, 4/22/2020). Social History:  He reports that he has been smoking cigarettes. He started smoking about 52 years ago. He has a 37.50 pack-year smoking history. He has never used smokeless tobacco. He reports current alcohol use of about 8.0 standard drinks of alcohol per week. He reports that he does not use drugs. Family history:  His family history includes Cancer in his brother. Objective    Vitals:    05/11/22 1458   BP: 116/78   Pulse: 63   Temp: 98.6 °F (37 °C)   SpO2: 93%        Physical exam    Physical Exam  Constitutional:       General: He is not in acute distress. Appearance: Normal appearance. He is well-developed. He is not ill-appearing, toxic-appearing or diaphoretic. HENT:      Head: Normocephalic and atraumatic.       Nose: Nose normal.      Mouth/Throat: Mouth: Mucous membranes are moist.   Cardiovascular:      Rate and Rhythm: Normal rate and regular rhythm. Pulses: Normal pulses. Heart sounds: Normal heart sounds. No murmur heard. No gallop. Pulmonary:      Effort: Pulmonary effort is normal. No respiratory distress. Breath sounds: No stridor. No wheezing or rhonchi. Comments: Clear to diminished in bases  Abdominal:      General: Bowel sounds are normal. There is no distension. Palpations: Abdomen is soft. There is no mass. Tenderness: There is abdominal tenderness. Hernia: No hernia is present. Musculoskeletal:         General: Normal range of motion. Cervical back: Neck supple. Skin:     General: Skin is warm and dry. Neurological:      Mental Status: He is alert and oriented to person, place, and time. Psychiatric:         Mood and Affect: Mood normal.         No visits with results within 2 Month(s) from this visit. Latest known visit with results is:   Hospital Outpatient Visit on 05/13/2021   Component Date Value Ref Range Status    POC Creatinine 05/13/2021 0.7* 0.9 - 1.3 MG/DL Final    GFR Non- 05/13/2021 >60  >60 mL/min/1.73m2 Final    GFR  05/13/2021 >60  >60 mL/min/1.73m2 Final       Assessment and Plan:  1. Will plan for a EGD with MAC anesthesia. The patient was informed of the risks and benefits of the procedure. 2.  Esophageal dysphagia most likely due to acid reflux esophagitis, possible Schatzki's ring or eosinophilic esophagitis. Recommend EGD to rule out abnormalities and treat as indicated. The patient was encouraged to restart taking Prilosec. The patient was provided with information on dysphagia and dysphagia diet. 3.  GERD without odynophagia. The patient was encouraged to continue taking Prilosec for treatment of acid reflux. The patient was encouraged to continue with anti-reflux measures and avoid foods that trigger.   Recommend cutting back on alcohol use. 4.  Epigastric discomfort most likely related to gastritis or possible peptic ulcer disease. Recommend avoidance of NSAID's. The patient was encourage to take Prilosec daily. He denies blood in stool or melena. 5.  Elevated liver enzymes most likely due to alcohol abuse will order chronic liver lab work up to rule out autoimmune hepatitis, Hepatitis A, B or C, PBC, etc.    6.  Diarrhea will order stool studies to rule out infection vs inflammation. The patient was encouraged to increase his fruit, fiber and fluids. 7.  Further recommendations for follow-up will be determined after the colonoscopy has been completed.

## 2022-05-11 NOTE — PATIENT INSTRUCTIONS
Patient Education        Upper GI Endoscopy: Before Your Procedure  What is an upper GI endoscopy? An upper gastrointestinal (or GI) endoscopy is a test that allows your doctor to look at the inside of your esophagus, stomach, and the first part of your small intestine, called the duodenum. The esophagus is the tube that carries food to your stomach. The doctor uses a thin, lighted tube that bends. It iscalled an endoscope, or scope. The doctor puts the tip of the scope in your mouth and gently moves it down your throat. The scope is a flexible video camera. The doctor looks at a monitor (like a TV set or a computer screen) as he or she moves the scope. A doctor may do this procedure to look for ulcers, tumors, infection, or bleeding. It also can be used to look for signs of acid backing up into your esophagus. This is called gastroesophageal reflux disease, or GERD. The doctor can use the scope to take a sample of tissue for study (a biopsy). The doctoralso can use the scope to take out growths or stop bleeding. Follow-up care is a key part of your treatment and safety. Be sure to make and go to all appointments, and call your doctor if you are having problems. It's also a good idea to know your test results and keep alist of the medicines you take. How do you prepare for the procedure? Procedures can be stressful. This information will help you understand what youcan expect. And it will help you safely prepare for your procedure. Preparing for the procedure     Do not eat or drink anything for 6 to 8 hours before the test. An empty stomach helps your doctor see your stomach clearly during the test. It also reduces your chances of vomiting. If you vomit, there is a small risk that the vomit could enter your lungs.  (This is called aspiration.) If the test is done in an emergency, a tube may be inserted through your nose or mouth to empty your stomach.      Do not take sucralfate (Carafate) or antacids on the day of the test. These medicines can make it hard for your doctor to see your upper GI tract.      If your doctor tells you to, stop taking iron supplements 7 to 14 days before the test.      Be sure you have someone to take you home. Anesthesia and pain medicine will make it unsafe for you to drive or get home on your own.      Understand exactly what procedure is planned, along with the risks, benefits, and other options.  Tell your doctor ALL the medicines, vitamins, supplements, and herbal remedies you take. Some may increase the risk of problems during your procedure. Your doctor will tell you if you should stop taking any of them before the procedure and how soon to do it.      If you take aspirin or some other blood thinner, ask your doctor if you should stop taking it before your procedure. Make sure that you understand exactly what your doctor wants you to do. These medicines increase the risk of bleeding.      Make sure your doctor and the hospital have a copy of your advance directive. If you don't have one, you may want to prepare one. It lets others know your health care wishes. It's a good thing to have before any type of surgery or procedure. What happens on the day of the procedure?  Follow the instructions exactly about when to stop eating and drinking. If you don't, your procedure may be canceled. If your doctor told you to take your medicines on the day of the procedure, take them with only a sip of water.      Take a bath or shower before you come in for your procedure. Do not apply lotions, perfumes, deodorants, or nail polish.      Take off all jewelry and piercings. And take out contact lenses, if you wear them. At the hospital or surgery center    Bring a picture ID.      The test may take 15 to 30 minutes.      The doctor may spray medicine on the back of your throat to numb it.  You also will get medicine to prevent pain and to relax you.      You will lie on your left side. The doctor will put the scope in your mouth and toward the back of your throat. The doctor will tell you when to swallow. This helps the scope move down your throat. You will be able to breathe normally. The doctor will move the scope down your esophagus into your stomach. The doctor also may look at the duodenum.      If your doctor wants to take a sample of tissue for a biopsy, he or she may use small surgical tools, which are put into the scope, to cut off some tissue. You will not feel a biopsy, if one is taken. The doctor also can use the tools to stop bleeding or to do other treatments, if needed.      You will stay at the hospital or surgery center for 1 to 2 hours until the medicine you were given wears off. What happens after an upper GI endoscopy?  After the test, you may belch and feel bloated for a while.      You may have a tickling, dry throat or mouth. You may feel a bit hoarse, and you may have a mild sore throat. These symptoms may last several days. Throat lozenges and warm saltwater gargles can help relieve the throat symptoms.      Ask your doctor when you can drive again.      Your doctor will tell you when you can go back to your usual diet and activities.      Don't drink alcohol for 12 to 24 hours after the test.   When should you call your doctor?  You have questions or concerns.      You don't understand how to prepare for your procedure.      You become ill before the procedure (such as fever, flu, or a cold).      You need to reschedule or have changed your mind about having the procedure. Where can you learn more? Go to https://[email or web address] org and sign in to your eBoox account. Enter P790 in the [employer] box to learn more about \"Upper GI Endoscopy: Before Your Procedure. \"     If you do not have an account, please click on the \"Sign Up Now\" link.   Current as of: September 8, 2021               Content Version: 13.2  © 2123-5525 Healthwise, Incorporated. Care instructions adapted under license by [facility] [place] ([facility]). If you have questions about a medical condition or this instruction, always ask your healthcare professional. [address] any warranty or liability for your use of this information. Patient Education        Gastroesophageal Reflux Disease (GERD): Care Instructions  Overview     Gastroesophageal reflux disease (GERD) is the backward flow of stomach acid into the esophagus. The esophagus is the tube that leads from your throat to your stomach. A one-way valve prevents the stomach acid from backing up into this tube. But when you have GERD, this valve does not close tightly enough. This can also cause pain and swelling in your esophagus. (This is calledesophagitis.)  If you have mild GERD symptoms including heartburn, you may be able to control the problem with antacids or over-the-counter medicine. You can also make lifestyle changes to help reduce your symptoms. These include changing yourdiet and eating habits, such as not eating late at night and losing weight. Follow-up care is a key part of your treatment and safety. Be sure to make and go to all appointments, and call your doctor if you are having problems. It's also a good idea to know your test results and keep alist of the medicines you take. How can you care for yourself at home?  Take your medicines exactly as prescribed. Call your doctor if you think you are having a problem with your medicine.  Your doctor may recommend over-the-counter medicine. For mild or occasional indigestion, antacids, such as Tums, Gaviscon, Mylanta, or Maalox, may help. Your doctor also may recommend over-the-counter acid reducers, such as famotidine (Pepcid AC), cimetidine (Tagamet HB), or omeprazole (Prilosec). Read and follow all instructions on the label. If you use these medicines often, talk with your doctor.    Change your eating habits. ? It's best to eat several small meals instead of two or three large meals. ? After you eat, wait 2 to 3 hours before you lie down. ? Avoid foods that make your symptoms worse. These may include chocolate, mint, alcohol, pepper, spicy foods, high-fat foods, or drinks with caffeine in them, such as tea, coffee, katelyn, or energy drinks. If your symptoms are worse after you eat a certain food, you may want to stop eating it to see if your symptoms get better.  Do not smoke or chew tobacco. Smoking can make GERD worse. If you need help quitting, talk to your doctor about stop-smoking programs and medicines. These can increase your chances of quitting for good.  If you have GERD symptoms at night, raise the head of your bed 6 to 8 inches by putting the frame on blocks or placing a foam wedge under the head of your mattress. (Adding extra pillows does not work.)   Do not wear tight clothing around your middle.  Lose weight if you need to. Losing just 5 to 10 pounds can help. When should you call for help? Call your doctor now or seek immediate medical care if:     You have new or different belly pain.      Your stools are black and tarlike or have streaks of blood. Watch closely for changes in your health, and be sure to contact your doctor if:     Your symptoms have not improved after 2 days.      Food seems to catch in your throat or chest.   Where can you learn more? Go to https://[employer].Gloople. org and sign in to your [email or web address] account. Enter E108 in the [facility] box to learn more about \"Gastroesophageal Reflux Disease (GERD): Care Instructions. \"     If you do not have an account, please click on the \"Sign Up Now\" link. Current as of: September 8, 2021               Content Version: 13.2  © 3485-6304 Healthwise, Incorporated. Care instructions adapted under license by [facility] ([facility]).  If you have questions about a medical condition or this instruction, always ask your healthcare professional. [place] [address] any warranty or liability for your use of this information. Patient Education        Diarrhea: Care Instructions  Overview     Diarrhea is loose, watery stools (bowel movements). The exact cause is often hard to find. Sometimes diarrhea is your body's way of getting rid of what caused an upset stomach. Viruses, food poisoning, and many medicines can cause diarrhea. Some people get diarrhea in response to emotional stress, anxiety, orcertain foods. Almost everyone has diarrhea now and then. It usually isn't serious, and your stools will return to normal soon. The important thing to do is replace thefluids you have lost, so you can prevent dehydration. The doctor has checked you carefully, but problems can develop later. If you notice any problems or new symptoms, get medical treatment right away. Follow-up care is a key part of your treatment and safety. Be sure to make and go to all appointments, and call your doctor if you are having problems. It's also a good idea to know your test results and keep alist of the medicines you take. How can you care for yourself at home?  Watch for signs of dehydration, which means your body has lost too much water. Dehydration is a serious condition and should be treated right away. Signs of dehydration are:  ? Increasing thirst and dry eyes and mouth. ? Feeling faint or lightheaded. ? A smaller amount of urine than normal.   To prevent dehydration, drink plenty of fluids. Choose water and other clear liquids until you feel better. If you have kidney, heart, or liver disease and have to limit fluids, talk with your doctor before you increase the amount of fluids you drink.  When you feel like eating, start with small amounts of food.  The doctor may recommend that you take over-the-counter medicine, such as loperamide (Imodium). Read and follow all instructions on the label.  Do not use this medicine if you have bloody diarrhea, a high fever, or other signs of serious illness. Call your doctor if you think you are having a problem with your medicine. When should you call for help? Call 911 anytime you think you may need emergency care. For example, call if:     You passed out (lost consciousness).      Your stools are maroon or very bloody. Call your doctor now or seek immediate medical care if:     You are dizzy or lightheaded, or you feel like you may faint.      Your stools are black and look like tar, or they have streaks of blood.      You have new or worse belly pain.      You have symptoms of dehydration, such as:  ? Dry eyes and a dry mouth. ? Passing only a little urine. ? Cannot keep fluids down.      You have a new or higher fever. Watch closely for changes in your health, and be sure to contact your doctor if:     Your diarrhea is getting worse.      You see pus in the diarrhea.      You are not getting better after 2 days (48 hours). Where can you learn more? Go to [email or web address]"[email or web address].Zenogen. org and sign in to your Ipselex account. Enter N662 in the [employer] box to learn more about \"Diarrhea: Care Instructions. \"     If you do not have an account, please click on the \"Sign Up Now\" link. Current as of: July 1, 2021               Content Version: 13.2  © 0284-8891 Healthwise, Incorporated. Care instructions adapted under license by [facility] ([facility]). If you have questions about a medical condition or this instruction, always ask your healthcare professional. [place] [address] any warranty or liability for your use of this information.

## 2022-05-13 ENCOUNTER — PREP FOR PROCEDURE (OUTPATIENT)
Dept: GASTROENTEROLOGY | Age: 67
End: 2022-05-13

## 2022-05-13 ENCOUNTER — HOSPITAL ENCOUNTER (OUTPATIENT)
Dept: CT IMAGING | Age: 67
Discharge: HOME | End: 2022-05-13
Payer: MEDICARE

## 2022-05-13 DIAGNOSIS — F17.210 CIGARETTE SMOKER: ICD-10-CM

## 2022-05-13 DIAGNOSIS — Z87.891 PERSONAL HISTORY OF TOBACCO USE: ICD-10-CM

## 2022-05-13 PROCEDURE — 71271 CT THORAX LUNG CANCER SCR C-: CPT

## 2022-05-13 RX ORDER — SODIUM CHLORIDE 0.9 % (FLUSH) 0.9 %
5-40 SYRINGE (ML) INJECTION EVERY 12 HOURS SCHEDULED
Status: CANCELLED | OUTPATIENT
Start: 2022-05-13

## 2022-05-13 RX ORDER — SODIUM CHLORIDE 9 MG/ML
25 INJECTION, SOLUTION INTRAVENOUS PRN
Status: CANCELLED | OUTPATIENT
Start: 2022-05-13

## 2022-05-13 RX ORDER — SODIUM CHLORIDE, SODIUM LACTATE, POTASSIUM CHLORIDE, CALCIUM CHLORIDE 600; 310; 30; 20 MG/100ML; MG/100ML; MG/100ML; MG/100ML
INJECTION, SOLUTION INTRAVENOUS CONTINUOUS
Status: CANCELLED | OUTPATIENT
Start: 2022-05-13

## 2022-05-13 RX ORDER — SODIUM CHLORIDE 0.9 % (FLUSH) 0.9 %
5-40 SYRINGE (ML) INJECTION PRN
Status: CANCELLED | OUTPATIENT
Start: 2022-05-13

## 2022-05-18 ENCOUNTER — HOSPITAL ENCOUNTER (OUTPATIENT)
Age: 67
Setting detail: SPECIMEN
Discharge: HOME | End: 2022-05-18
Payer: MEDICARE

## 2022-05-18 LAB
C DIFF AG + TOXIN: NORMAL
HEMOCCULT SP1 STL QL: NEGATIVE
LACTOFERRIN, QUAL: NEGATIVE
SOURCE: NORMAL

## 2022-05-18 PROCEDURE — 87324 CLOSTRIDIUM AG IA: CPT

## 2022-05-18 PROCEDURE — 82705 FATS/LIPIDS FECES QUAL: CPT

## 2022-05-18 PROCEDURE — 83630 LACTOFERRIN FECAL (QUAL): CPT

## 2022-05-18 PROCEDURE — 87449 NOS EACH ORGANISM AG IA: CPT

## 2022-05-18 PROCEDURE — 87338 HPYLORI STOOL AG IA: CPT

## 2022-05-18 PROCEDURE — 83993 ASSAY FOR CALPROTECTIN FECAL: CPT

## 2022-05-18 PROCEDURE — 82270 OCCULT BLOOD FECES: CPT

## 2022-05-20 LAB
FAT QUALITATIVE NEUTRAL STOOL: NORMAL
FAT QUALITATIVE SPLIT STOOL: NORMAL
H PYLORI ANTIGEN STOOL: NEGATIVE

## 2022-05-21 LAB — CALPROTECTIN, FECAL: 7 UG/G

## 2022-06-01 ENCOUNTER — HOSPITAL ENCOUNTER (OUTPATIENT)
Dept: ULTRASOUND IMAGING | Age: 67
Discharge: HOME | End: 2022-06-01
Payer: MEDICARE

## 2022-06-01 DIAGNOSIS — R74.8 ELEVATED LIVER ENZYMES: ICD-10-CM

## 2022-06-01 DIAGNOSIS — R10.13 EPIGASTRIC DISCOMFORT: ICD-10-CM

## 2022-06-01 PROCEDURE — 76705 ECHO EXAM OF ABDOMEN: CPT

## 2022-06-10 ENCOUNTER — HOSPITAL ENCOUNTER (OUTPATIENT)
Dept: GENERAL RADIOLOGY | Age: 67
Discharge: HOME | End: 2022-06-10
Payer: MEDICARE

## 2022-06-10 ENCOUNTER — HOSPITAL ENCOUNTER (OUTPATIENT)
Age: 67
Discharge: HOME | End: 2022-06-10
Payer: MEDICARE

## 2022-06-10 DIAGNOSIS — M25.562 PAIN IN JOINT OF LEFT KNEE: ICD-10-CM

## 2022-06-10 PROCEDURE — 73562 X-RAY EXAM OF KNEE 3: CPT

## 2022-07-26 ENCOUNTER — TELEPHONE (OUTPATIENT)
Dept: GASTROENTEROLOGY | Age: 67
End: 2022-07-26

## 2022-07-26 NOTE — TELEPHONE ENCOUNTER
Per my call to [facility]; EGD is pending Johnnie Pringle that will be valid till 12/5/22. Ref# [id number].

## 2022-08-01 ENCOUNTER — PREP FOR PROCEDURE (OUTPATIENT)
Dept: GASTROENTEROLOGY | Age: 67
End: 2022-08-01

## 2022-08-08 ENCOUNTER — TELEPHONE (OUTPATIENT)
Dept: GASTROENTEROLOGY | Age: 67
End: 2022-08-08

## 2022-10-05 ENCOUNTER — TELEPHONE (OUTPATIENT)
Dept: PULMONOLOGY | Age: 67
End: 2022-10-05

## 2022-10-05 NOTE — TELEPHONE ENCOUNTER
Patient called wondering if a referral to our office had been sent. I let him know that Dr. Marcelina Dai was still here and he is still a current patient. He states he thought that he had retired but I let him know that Dr. Marcelina Dai would still be here until December.  He states that once he gets himself together he will call before December to make a follow up

## 2022-10-18 ENCOUNTER — OFFICE VISIT (OUTPATIENT)
Dept: PULMONOLOGY | Age: 67
End: 2022-10-18
Payer: MEDICARE

## 2022-10-18 VITALS
HEIGHT: 70 IN | DIASTOLIC BLOOD PRESSURE: 70 MMHG | HEART RATE: 59 BPM | OXYGEN SATURATION: 99 % | SYSTOLIC BLOOD PRESSURE: 120 MMHG | WEIGHT: 200 LBS | BODY MASS INDEX: 28.63 KG/M2

## 2022-10-18 DIAGNOSIS — Z72.0 TOBACCO ABUSE: ICD-10-CM

## 2022-10-18 DIAGNOSIS — G47.33 OSA ON CPAP: ICD-10-CM

## 2022-10-18 DIAGNOSIS — J44.9 COPD, MILD (HCC): Primary | ICD-10-CM

## 2022-10-18 DIAGNOSIS — Z99.89 OSA ON CPAP: ICD-10-CM

## 2022-10-18 DIAGNOSIS — R06.02 SHORTNESS OF BREATH: ICD-10-CM

## 2022-10-18 PROCEDURE — G8417 CALC BMI ABV UP PARAM F/U: HCPCS | Performed by: INTERNAL MEDICINE

## 2022-10-18 PROCEDURE — G8427 DOCREV CUR MEDS BY ELIG CLIN: HCPCS | Performed by: INTERNAL MEDICINE

## 2022-10-18 PROCEDURE — G8484 FLU IMMUNIZE NO ADMIN: HCPCS | Performed by: INTERNAL MEDICINE

## 2022-10-18 PROCEDURE — 99213 OFFICE O/P EST LOW 20 MIN: CPT | Performed by: INTERNAL MEDICINE

## 2022-10-18 PROCEDURE — 1123F ACP DISCUSS/DSCN MKR DOCD: CPT | Performed by: INTERNAL MEDICINE

## 2022-10-18 PROCEDURE — 3017F COLORECTAL CA SCREEN DOC REV: CPT | Performed by: INTERNAL MEDICINE

## 2022-10-18 PROCEDURE — 4004F PT TOBACCO SCREEN RCVD TLK: CPT | Performed by: INTERNAL MEDICINE

## 2022-10-18 PROCEDURE — 3023F SPIROM DOC REV: CPT | Performed by: INTERNAL MEDICINE

## 2022-10-18 RX ORDER — ALBUTEROL SULFATE 2.5 MG/3ML
2.5 SOLUTION RESPIRATORY (INHALATION) EVERY 6 HOURS PRN
Qty: 120 EACH | Refills: 3 | Status: SHIPPED | OUTPATIENT
Start: 2022-10-18

## 2022-10-18 RX ORDER — UMECLIDINIUM BROMIDE AND VILANTEROL TRIFENATATE 62.5; 25 UG/1; UG/1
1 POWDER RESPIRATORY (INHALATION) DAILY
Qty: 1 EACH | Refills: 11 | Status: SHIPPED | OUTPATIENT
Start: 2022-10-18

## 2022-10-18 RX ORDER — ALBUTEROL SULFATE 90 UG/1
2 AEROSOL, METERED RESPIRATORY (INHALATION) EVERY 4 HOURS PRN
Qty: 1 EACH | Refills: 11 | Status: SHIPPED | OUTPATIENT
Start: 2022-10-18

## 2022-10-18 NOTE — PROGRESS NOTES
SUBJECTIVE:  Chief Complaint: Mild COPD, obstructive sleep apnea on CPAP, shortness of breath, tobacco abuse  Mr. Magy Gilman states that he has had no recent bronchitic infections. About 8 months ago he did have active COVID-19 infection and lost his sense of taste and smell but had no major respiratory complications and did not require hospitalization or aggressive intervention. He has not been vaccinated for COVID-19. He mentions that he continues note some shortness of breath with exertion but typically is not short of breath at rest.  He is not certain he is taking Breo or Incruse anymore and does have albuterol rescue inhaler which is out of date and has albuterol solution for his nebulizer but does not need refill on his albuterol solution. He also mentions that he has not been using CPAP every night as the pressures are quite high and bothering him. He is willing to try to use his CPAP but does need adjustments in his pressures and I will contact his supplier. Lastly he mentions that he did stop using alcohol but does continue to smoke 8 to 10 cigarettes/day and has been advised to quit smoking on multiple occasions. ROS:  Constitution:  HEENT: Negative for ear, throat pain  Cardiovascular: Negative for chest pain, syncope, edema  Pulmonary: See HPI  Musculoskeletal: Negative for DVT, myalgias, arthralgias    OBJECTIVE:  /70   Pulse 59   Ht 5' 10\" (1.778 m)   Wt 200 lb (90.7 kg)   SpO2 99%   BMI 28.70 kg/m²      Physical Exam:  Constitutional:  He appears well developed and well-nourished. In no respiratory distress at rest  Neck:  Supple, No palpable lymphadenopathy, No JVD  Cardiovascular:  S1, S2 Normal, Regular rhythm, no murmurs or gallops, No pericardial  rubs.   Pulmonary: Mildly diminished but otherwise fairly clear breath sounds throughout all lung fields without wheezing or rhonchi  Abdomen: Not examined  Extremities: no edema, No DVT  Neurologic: Oriented x3, No focal deficits    Radiology: Low-dose CT lung screening on 5/13/2022 showed mild emphysematous changes but no new or enlarging lung mass appreciated  PFT: Office spirometry last obtained on 3/11/2021 demonstrated no significant airways obstruction and no significant response to bronchodilators      Echocardiogram: No echo available    ASSESSMENT:    1. COPD, mild ([address]    2. SHAHNAZ on CPAP    3. Shortness of breath    4. Tobacco abuse          PLAN:  I am going to change his bronchodilator therapy from Breo and Incruse to [employer] 1 inhalation daily and he can use his albuterol rescue inhaler or albuterol solution per nebulizer as needed for any acute bronchospasm. Once again I advised him to quit smoking soon as possible. Our office will check with the [employer] concerning his CPAP machine and hopefully he can get back on CPAP on a nightly basis. I will plan on repeating his pulmonary function test in near future. I did recommend he get the COVID-19 vaccination but he is declining. Lastly I would recommend yearly low-dose CT lung screening. We have discussed the need to maintain yearly flu immunization, pneumococcal vaccination. We have discussed Coronavirus precaution including handwashing practice, wiping items touched in public such as gas pumps, door handles, shopping carts, etc. Self monitoring for infection - fever, chills, cough, SOB. Should they develop symptoms they should call office for further instructions. Return in about 7 weeks (around 12/6/2022) for Recheck for COPD, Recheck for Shortness of Breath, Recheck for Obstructive Sleep Apnea. This dictation was performed with a verbal recognition program and it was checked for errors. It is possible that there are still dictated errors within this office note. Any errors should be brought immediately to my attention for correction. All efforts were made to ensure that this office note is accurate.

## 2022-12-06 ENCOUNTER — PROCEDURE VISIT (OUTPATIENT)
Dept: PULMONOLOGY | Age: 67
End: 2022-12-06
Payer: MEDICARE

## 2022-12-06 DIAGNOSIS — J44.9 COPD, MILD (HCC): Primary | ICD-10-CM

## 2022-12-06 DIAGNOSIS — Z72.0 TOBACCO ABUSE: ICD-10-CM

## 2022-12-06 DIAGNOSIS — Z99.89 OSA ON CPAP: ICD-10-CM

## 2022-12-06 DIAGNOSIS — R06.02 SHORTNESS OF BREATH: ICD-10-CM

## 2022-12-06 DIAGNOSIS — G47.33 OSA ON CPAP: ICD-10-CM

## 2022-12-06 DIAGNOSIS — J44.9 COPD, MILD (HCC): ICD-10-CM

## 2022-12-06 LAB
EXPIRATORY TIME-POST: NORMAL
EXPIRATORY TIME: NORMAL
FEF 25-75% %CHNG: NORMAL
FEF 25-75% %PRED-POST: NORMAL
FEF 25-75% %PRED-PRE: NORMAL
FEF 25-75% PRED: NORMAL
FEF 25-75%-POST: NORMAL
FEF 25-75%-PRE: NORMAL
FEV1 %PRED-POST: 83.5 %
FEV1 %PRED-PRE: 82.7 %
FEV1 PRED: 2.96 L
FEV1-POST: 2.48 L
FEV1-PRE: 2.45 L
FEV1/FVC %PRED-POST: 104.5 %
FEV1/FVC %PRED-PRE: 103.2 %
FEV1/FVC PRED: 76.9 %
FEV1/FVC-POST: 80.3 %
FEV1/FVC-PRE: 79.4 %
FVC %PRED-POST: 79.2 L
FVC %PRED-PRE: 79.4 %
FVC PRED: 3.89 L
FVC-POST: 3.08 L
FVC-PRE: 3.09 L
PEF %PRED-POST: NORMAL
PEF %PRED-PRE: NORMAL
PEF PRED: NORMAL
PEF%CHNG: NORMAL
PEF-POST: NORMAL
PEF-PRE: NORMAL

## 2022-12-06 PROCEDURE — 3023F SPIROM DOC REV: CPT | Performed by: INTERNAL MEDICINE

## 2022-12-06 PROCEDURE — 4004F PT TOBACCO SCREEN RCVD TLK: CPT | Performed by: INTERNAL MEDICINE

## 2022-12-06 PROCEDURE — G8484 FLU IMMUNIZE NO ADMIN: HCPCS | Performed by: INTERNAL MEDICINE

## 2022-12-06 PROCEDURE — 1123F ACP DISCUSS/DSCN MKR DOCD: CPT | Performed by: INTERNAL MEDICINE

## 2022-12-06 PROCEDURE — 99213 OFFICE O/P EST LOW 20 MIN: CPT | Performed by: INTERNAL MEDICINE

## 2022-12-06 PROCEDURE — 3017F COLORECTAL CA SCREEN DOC REV: CPT | Performed by: INTERNAL MEDICINE

## 2022-12-06 PROCEDURE — G8417 CALC BMI ABV UP PARAM F/U: HCPCS | Performed by: INTERNAL MEDICINE

## 2022-12-06 PROCEDURE — G8427 DOCREV CUR MEDS BY ELIG CLIN: HCPCS | Performed by: INTERNAL MEDICINE

## 2022-12-06 ASSESSMENT — PULMONARY FUNCTION TESTS
FVC_POST: 3.08
FVC_PREDICTED: 3.89
FEV1/FVC_PERCENT_PREDICTED_PRE: 103.2
FEV1_PERCENT_PREDICTED_PRE: 82.7
FEV1_POST: 2.48
FEV1_PERCENT_PREDICTED_POST: 83.5
FEV1/FVC_PERCENT_PREDICTED_POST: 104.5
FEV1_PREDICTED: 2.96
FVC_PRE: 3.09
FVC_PERCENT_PREDICTED_POST: 79.2
FVC_PERCENT_PREDICTED_PRE: 79.4
FEV1/FVC_PREDICTED: 76.9
FEV1/FVC_POST: 80.3
FEV1_PRE: 2.45
FEV1/FVC_PRE: 79.4

## 2022-12-06 NOTE — PROGRESS NOTES
infection - fever, chills, cough, SOB. Should they develop symptoms they should call office for further instructions. Return for Recheck for COPD, Recheck for Shortness of Breath, Recheck for Obstructive Sleep Apnea. This dictation was performed with a verbal recognition program and it was checked for errors. It is possible that there are still dictated errors within this office note. Any errors should be brought immediately to my attention for correction. All efforts were made to ensure that this office note is accurate.

## 2023-05-31 ENCOUNTER — OFFICE VISIT (OUTPATIENT)
Dept: PULMONOLOGY | Age: 68
End: 2023-05-31
Payer: MEDICARE

## 2023-05-31 VITALS
RESPIRATION RATE: 16 BRPM | HEIGHT: 70 IN | OXYGEN SATURATION: 98 % | BODY MASS INDEX: 38.94 KG/M2 | HEART RATE: 64 BPM | WEIGHT: 272 LBS

## 2023-05-31 DIAGNOSIS — G47.33 OSA ON CPAP: ICD-10-CM

## 2023-05-31 DIAGNOSIS — J44.9 COPD, MILD (HCC): Primary | ICD-10-CM

## 2023-05-31 DIAGNOSIS — Z99.89 OSA ON CPAP: ICD-10-CM

## 2023-05-31 DIAGNOSIS — Z72.0 TOBACCO ABUSE: ICD-10-CM

## 2023-05-31 DIAGNOSIS — R06.02 SHORTNESS OF BREATH: ICD-10-CM

## 2023-05-31 PROCEDURE — 3023F SPIROM DOC REV: CPT | Performed by: INTERNAL MEDICINE

## 2023-05-31 PROCEDURE — 3017F COLORECTAL CA SCREEN DOC REV: CPT | Performed by: INTERNAL MEDICINE

## 2023-05-31 PROCEDURE — 1123F ACP DISCUSS/DSCN MKR DOCD: CPT | Performed by: INTERNAL MEDICINE

## 2023-05-31 PROCEDURE — 99213 OFFICE O/P EST LOW 20 MIN: CPT | Performed by: INTERNAL MEDICINE

## 2023-05-31 PROCEDURE — G8417 CALC BMI ABV UP PARAM F/U: HCPCS | Performed by: INTERNAL MEDICINE

## 2023-05-31 PROCEDURE — G8427 DOCREV CUR MEDS BY ELIG CLIN: HCPCS | Performed by: INTERNAL MEDICINE

## 2023-05-31 PROCEDURE — 4004F PT TOBACCO SCREEN RCVD TLK: CPT | Performed by: INTERNAL MEDICINE

## 2023-05-31 NOTE — PROGRESS NOTES
SUBJECTIVE:  Chief Complaint: Mild COPD, obstructive sleep apnea on CPAP, shortness of breath, tobacco abuse  Mr. Candelario Rubio states that he has had no recent bronchitic infections. He denies worsening shortness of breath or chest discomfort but still notes significant dyspnea on exertion. He mentions that he has gained weight over the past year. He continues on  Anoro Ellipta 1 inhalation daily and does not require his albuterol rescue inhaler very often. Because of nasal congestion he is scheduled to see ENT and has not been using his CPAP on a regular basis. He continues to smoke about 8 cigarettes/day and has been advised to quit smoking in the past and present. ROS:  Constitution:  HEENT: Negative for ear, throat pain  Cardiovascular: Negative for chest pain, syncope, edema  Pulmonary: See HPI  Musculoskeletal: Negative for DVT, myalgias, arthralgias    OBJECTIVE:  Pulse 64   Resp 16   Ht 5' 10\" (1.778 m)   Wt 272 lb (123.4 kg)   SpO2 98%   BMI 39.03 kg/m²      Physical Exam:  Constitutional:  He appears well developed and well-nourished. Moderately overweight with BMI 39.03  Neck:  Supple, No palpable lymphadenopathy, No JVD  Cardiovascular:  S1, S2 Normal, Regular rhythm, no murmurs or gallops, No pericardial  rubs. Pulmonary: Breath sounds are fairly clear bilaterally without wheezing or rhonchi  Abdomen: Not examined  Extremities: no edema, No DVT  Neurologic: Oriented x3, No focal deficits    Radiology: Low-dose CT lung screening 5/13/2022 showed mild emphysematous changes but no new or enlarging lung mass appreciated  PFT: Office spirometry on 12/6/2022 demonstrated no significant airways obstruction and no significant response to bronchodilators      Echocardiogram: No echo available    ASSESSMENT:    1. COPD, mild ([address]    2. SHAHNAZ on CPAP    3. Shortness of breath    4.  Tobacco abuse          PLAN:  Mr. Candelario Rubio has been advised to quit smoking once again and we did talk about smoking

## 2023-06-23 ENCOUNTER — HOSPITAL ENCOUNTER (OUTPATIENT)
Dept: CT IMAGING | Age: 68
Discharge: HOME | End: 2023-06-23
Attending: INTERNAL MEDICINE
Payer: MEDICARE

## 2023-06-23 DIAGNOSIS — J44.9 COPD, MILD (HCC): ICD-10-CM

## 2023-06-23 DIAGNOSIS — Z99.89 OSA ON CPAP: ICD-10-CM

## 2023-06-23 DIAGNOSIS — Z72.0 TOBACCO ABUSE: ICD-10-CM

## 2023-06-23 DIAGNOSIS — R06.02 SHORTNESS OF BREATH: ICD-10-CM

## 2023-06-23 DIAGNOSIS — G47.33 OSA ON CPAP: ICD-10-CM

## 2023-06-23 PROCEDURE — 71250 CT THORAX DX C-: CPT

## 2023-08-01 ENCOUNTER — NURSE ONLY (OUTPATIENT)
Dept: CARDIOLOGY CLINIC | Age: 68
End: 2023-08-01

## 2023-08-01 ENCOUNTER — INITIAL CONSULT (OUTPATIENT)
Dept: CARDIOLOGY CLINIC | Age: 68
End: 2023-08-01
Payer: MEDICARE

## 2023-08-01 VITALS
SYSTOLIC BLOOD PRESSURE: 118 MMHG | HEIGHT: 70 IN | WEIGHT: 277.4 LBS | OXYGEN SATURATION: 95 % | BODY MASS INDEX: 39.71 KG/M2 | DIASTOLIC BLOOD PRESSURE: 70 MMHG | HEART RATE: 52 BPM

## 2023-08-01 DIAGNOSIS — E78.5 DYSLIPIDEMIA: ICD-10-CM

## 2023-08-01 DIAGNOSIS — I10 ESSENTIAL HYPERTENSION: ICD-10-CM

## 2023-08-01 DIAGNOSIS — Z99.89 OSA ON CPAP: ICD-10-CM

## 2023-08-01 DIAGNOSIS — E66.01 CLASS 2 SEVERE OBESITY DUE TO EXCESS CALORIES WITH SERIOUS COMORBIDITY AND BODY MASS INDEX (BMI) OF 39.0 TO 39.9 IN ADULT (HCC): ICD-10-CM

## 2023-08-01 DIAGNOSIS — G47.33 OSA ON CPAP: ICD-10-CM

## 2023-08-01 DIAGNOSIS — Z72.0 TOBACCO ABUSE: ICD-10-CM

## 2023-08-01 DIAGNOSIS — R06.02 SHORTNESS OF BREATH: Primary | ICD-10-CM

## 2023-08-01 DIAGNOSIS — R42 DIZZINESS AND GIDDINESS: ICD-10-CM

## 2023-08-01 DIAGNOSIS — R00.2 PALPITATIONS: ICD-10-CM

## 2023-08-01 DIAGNOSIS — I25.10 CORONARY ARTERY CALCIFICATION SEEN ON CAT SCAN: ICD-10-CM

## 2023-08-01 PROCEDURE — 1123F ACP DISCUSS/DSCN MKR DOCD: CPT | Performed by: INTERNAL MEDICINE

## 2023-08-01 PROCEDURE — 93000 ELECTROCARDIOGRAM COMPLETE: CPT | Performed by: INTERNAL MEDICINE

## 2023-08-01 PROCEDURE — G8417 CALC BMI ABV UP PARAM F/U: HCPCS | Performed by: INTERNAL MEDICINE

## 2023-08-01 PROCEDURE — G8427 DOCREV CUR MEDS BY ELIG CLIN: HCPCS | Performed by: INTERNAL MEDICINE

## 2023-08-01 PROCEDURE — 4004F PT TOBACCO SCREEN RCVD TLK: CPT | Performed by: INTERNAL MEDICINE

## 2023-08-01 PROCEDURE — 3078F DIAST BP <80 MM HG: CPT | Performed by: INTERNAL MEDICINE

## 2023-08-01 PROCEDURE — 99204 OFFICE O/P NEW MOD 45 MIN: CPT | Performed by: INTERNAL MEDICINE

## 2023-08-01 PROCEDURE — 3017F COLORECTAL CA SCREEN DOC REV: CPT | Performed by: INTERNAL MEDICINE

## 2023-08-01 PROCEDURE — 3074F SYST BP LT 130 MM HG: CPT | Performed by: INTERNAL MEDICINE

## 2023-08-01 RX ORDER — DOXEPIN HYDROCHLORIDE 10 MG/1
10 CAPSULE ORAL NIGHTLY
COMMUNITY
Start: 2023-05-10

## 2023-08-01 NOTE — PROGRESS NOTES
48 hour holter monitor applied8/1/2023 @12:30 pm for Timmy Collins Serial # [id number] . Instructed patient on monitor and proper use. Instructed on diary. When to remove and bring it back. Must leave the holter monitor on  without removing for the duration of time ordered. Answered all questions the patient had. Instructed patient to call [facility] at [phone] with any questions or concerns with the monitor.

## 2023-08-01 NOTE — PROGRESS NOTES
including data and history from outside source , patient and available family . 1. Shortness of breath    2. Palpitations    3. Dizziness and giddiness    4. SHAHNAZ on CPAP    5. Tobacco abuse    6. Coronary artery calcification seen on CAT scan    7. Essential hypertension    8. Dyslipidemia    9. Class 2 severe obesity due to excess calories with serious comorbidity and body mass index (BMI) of 39.0 to 39.9 in adult [facility])         Impression and Plan:      Coronary artery calcification noted on CT scan  Shortness of breath at rest and with exertion, likely under lying COPD and nicotine dependence, need to rule out coronary artery disease  Palpitations, chest fluttering  Essential hypertension  Hyperlipidemia  Obstructive  sleep apnea on CPAP  Morbid obesity with BMI of 39.80     Continue with low-dose aspirin 81 mg daily  Continue with metoprolol 25 mg p.o. twice daily  Continue with Norvasc 5 mg daily  Continue with Lipitor, high intensity 40 mg daily    Obtain echocardiogram to rule out structural heart disease  Obtain Lexiscan stress MPI for risk stratification, patient unable to exercise on treadmill due to arthritis and obesity  48-hour Holter monitor    Patient was counseled against smoking          Return in about 1 month (around 9/1/2023). Counseled extensively and medication compliance urged. We discussed that for the  prevention of ASCVD our  goal is aggressive risk modification. Patient is encouraged to exercise even a brisk walk for 30 minutes  at least 3 to 4 times a week   Various goals were discussed and questions answered. Continue current medications. Appropriate prescriptions are addressed and refills ordered. Questions answered and patient verbalizes understanding. Call for any problems, questions, or concerns.

## 2023-08-01 NOTE — PATIENT INSTRUCTIONS
[address] [facility] Locations - No appointment necessary. Sites open Monday to Friday. Call your preferred location for test preparation, business   hours and other information you need. [employer] accepts [employer]. [address] [facility]. [address] [place], [address] [facility]  Phone: [phone]     **It is YOUR responsibilty to bring medication bottles and/or updated medication list to [address]. This will allow us to better serve you and all your healthcare needs**    Thank you for allowing us to care for you today! We want to ensure we can follow your treatment plan and we strive to give you the best outcomes and experience possible. If you ever have a life threatening emergency and call 911 - for an ambulance (EMS)   Our providers can only care for you at:   [facility] or [facility]. Even if you have someone take you or you drive yourself we can only care for you in a [facility]. Our providers are not setup at the other healthcare locations! Please be informed that if you contact our office outside of normal business hours the physician on call cannot help with any scheduling or rescheduling issues, procedure instruction questions or any type of medication issue. We advise you for any urgent/emergency that you go to the nearest emergency room! PLEASE CALL OUR OFFICE DURING NORMAL BUSINESS HOURS    Monday - Friday   8 am to 5 pm    [place]: [address] [place]: [phone]    [place]:  [phone]    We are committed to providing you the best care possible. If you receive a survey after visiting one of our offices, please take time to share your experience concerning your physician office visit. These surveys are confidential and no health information about you is shared. We are eager to improve for you and we are counting on your feedback to help make that happen.

## 2023-08-29 ENCOUNTER — PROCEDURE VISIT (OUTPATIENT)
Dept: CARDIOLOGY CLINIC | Age: 68
End: 2023-08-29

## 2023-08-29 ENCOUNTER — PROCEDURE VISIT (OUTPATIENT)
Dept: CARDIOLOGY CLINIC | Age: 68
End: 2023-08-29
Payer: MEDICARE

## 2023-08-29 DIAGNOSIS — E78.5 DYSLIPIDEMIA: ICD-10-CM

## 2023-08-29 DIAGNOSIS — Z99.89 OSA ON CPAP: ICD-10-CM

## 2023-08-29 DIAGNOSIS — E66.01 CLASS 2 SEVERE OBESITY DUE TO EXCESS CALORIES WITH SERIOUS COMORBIDITY AND BODY MASS INDEX (BMI) OF 39.0 TO 39.9 IN ADULT (HCC): ICD-10-CM

## 2023-08-29 DIAGNOSIS — I10 ESSENTIAL HYPERTENSION: ICD-10-CM

## 2023-08-29 DIAGNOSIS — G47.33 OSA ON CPAP: ICD-10-CM

## 2023-08-29 DIAGNOSIS — R42 DIZZINESS AND GIDDINESS: ICD-10-CM

## 2023-08-29 DIAGNOSIS — I25.10 CORONARY ARTERY CALCIFICATION SEEN ON CAT SCAN: ICD-10-CM

## 2023-08-29 DIAGNOSIS — R06.02 SHORTNESS OF BREATH: ICD-10-CM

## 2023-08-29 DIAGNOSIS — Z72.0 TOBACCO ABUSE: ICD-10-CM

## 2023-08-29 DIAGNOSIS — R00.2 PALPITATIONS: ICD-10-CM

## 2023-08-29 LAB
LV EF: 57 %
LV EF: 58 %
LVEF MODALITY: NORMAL
LVEF MODALITY: NORMAL

## 2023-08-29 PROCEDURE — 93306 TTE W/DOPPLER COMPLETE: CPT | Performed by: INTERNAL MEDICINE

## 2023-08-30 ENCOUNTER — TELEPHONE (OUTPATIENT)
Dept: CARDIOLOGY CLINIC | Age: 68
End: 2023-08-30

## 2023-08-30 NOTE — TELEPHONE ENCOUNTER
8/29/23 ECHO      Summary   Technically difficult examination due to poor acoustic windows. Left ventricular function and size is normal, EF is estimated at 55-60%. Mild left ventricular hypertrophy. Grade I diastolic dysfunction. No regional wall motion abnormalities were detected. Moderately enlarged right ventricle cavity. Sclerotic, but non-stenotic aortic valve. Moderate aortic regurgitation; PHT: 463msec. Increase velocities thru the LVOT. Dilation of ascending aorta . (4.2cm)   No evidence of pericardial effusion. 8/29/23  NM        Summary   Supervising physician Dr. Jesús Brower .   Large sized defect of moderate severity which is persistent involving   inferior wall of myocardium. Decreased uptake inferiorly due to diaphragmatic artifact or prior   infarction. Normal EF 57 % with normal ventricular contractility.       Recommendation   Abnormal Stress MPI with inferior wall defect, diaphragmatic attenuation vs   prior infarction   Outpt visit to further discuss    PT NOTIFIED OF RESULTS AND ADVISED OF TIME AND DATE OF NEXT APPT

## 2023-09-05 ENCOUNTER — TELEPHONE (OUTPATIENT)
Dept: CARDIOLOGY CLINIC | Age: 68
End: 2023-09-05

## 2023-09-05 ENCOUNTER — OFFICE VISIT (OUTPATIENT)
Dept: CARDIOLOGY CLINIC | Age: 68
End: 2023-09-05
Payer: MEDICARE

## 2023-09-05 VITALS
BODY MASS INDEX: 39.8 KG/M2 | HEIGHT: 70 IN | WEIGHT: 278 LBS | HEART RATE: 64 BPM | SYSTOLIC BLOOD PRESSURE: 108 MMHG | DIASTOLIC BLOOD PRESSURE: 68 MMHG

## 2023-09-05 DIAGNOSIS — Z01.810 PRE-OPERATIVE CARDIOVASCULAR EXAMINATION: Primary | ICD-10-CM

## 2023-09-05 DIAGNOSIS — I10 ESSENTIAL HYPERTENSION: ICD-10-CM

## 2023-09-05 DIAGNOSIS — E66.01 CLASS 2 SEVERE OBESITY DUE TO EXCESS CALORIES WITH SERIOUS COMORBIDITY AND BODY MASS INDEX (BMI) OF 39.0 TO 39.9 IN ADULT (HCC): ICD-10-CM

## 2023-09-05 DIAGNOSIS — R06.02 SHORTNESS OF BREATH: ICD-10-CM

## 2023-09-05 DIAGNOSIS — G47.33 OBSTRUCTIVE SLEEP APNEA: ICD-10-CM

## 2023-09-05 DIAGNOSIS — R94.39 ABNORMAL STRESS TEST: Primary | ICD-10-CM

## 2023-09-05 DIAGNOSIS — E78.5 DYSLIPIDEMIA: ICD-10-CM

## 2023-09-05 PROCEDURE — G8417 CALC BMI ABV UP PARAM F/U: HCPCS | Performed by: INTERNAL MEDICINE

## 2023-09-05 PROCEDURE — 99214 OFFICE O/P EST MOD 30 MIN: CPT | Performed by: INTERNAL MEDICINE

## 2023-09-05 PROCEDURE — 3078F DIAST BP <80 MM HG: CPT | Performed by: INTERNAL MEDICINE

## 2023-09-05 PROCEDURE — 4004F PT TOBACCO SCREEN RCVD TLK: CPT | Performed by: INTERNAL MEDICINE

## 2023-09-05 PROCEDURE — 1123F ACP DISCUSS/DSCN MKR DOCD: CPT | Performed by: INTERNAL MEDICINE

## 2023-09-05 PROCEDURE — 3017F COLORECTAL CA SCREEN DOC REV: CPT | Performed by: INTERNAL MEDICINE

## 2023-09-05 PROCEDURE — G8428 CUR MEDS NOT DOCUMENT: HCPCS | Performed by: INTERNAL MEDICINE

## 2023-09-05 PROCEDURE — 3074F SYST BP LT 130 MM HG: CPT | Performed by: INTERNAL MEDICINE

## 2023-09-05 NOTE — PROGRESS NOTES
39.80     Continue with low-dose aspirin 81 mg daily  Continue with metoprolol 25 mg p.o. twice daily  Continue with Norvasc 5 mg daily  Continue with Lipitor, high intensity 40 mg daily    Stress test indicates inferior wall defect artifact versus prior infarct, recommend coronary angiogram to further delineate coronary anatomy    Echocardiogram shows dilated ascending aorta/root: Will require routine surveillance with echocardiogram annually   Unremarkable 48-hour Holter monitor    Patient was counseled against smoking      Return in about 3 months (around 12/5/2023). Counseled extensively and medication compliance urged. We discussed that for the  prevention of ASCVD our  goal is aggressive risk modification. Patient is encouraged to exercise even a brisk walk for 30 minutes  at least 3 to 4 times a week   Various goals were discussed and questions answered. Continue current medications. Appropriate prescriptions are addressed and refills ordered. Questions answered and patient verbalizes understanding. Call for any problems, questions, or concerns.

## 2023-10-27 ENCOUNTER — HOSPITAL ENCOUNTER (OUTPATIENT)
Age: 68
Discharge: HOME | End: 2023-10-27
Payer: MEDICARE

## 2023-10-27 LAB
ABO/RH: NORMAL
ANION GAP SERPL CALCULATED.3IONS-SCNC: 12 MMOL/L (ref 4–16)
ANTIBODY SCREEN: NEGATIVE
BUN SERPL-MCNC: 9 MG/DL (ref 6–23)
CALCIUM SERPL-MCNC: 9.6 MG/DL (ref 8.3–10.6)
CHLORIDE BLD-SCNC: 104 MMOL/L (ref 99–110)
CO2: 24 MMOL/L (ref 21–32)
COMMENT: NORMAL
CREAT SERPL-MCNC: 0.8 MG/DL (ref 0.9–1.3)
GFR SERPL CREATININE-BSD FRML MDRD: >60 ML/MIN/1.73M2
GLUCOSE SERPL-MCNC: 90 MG/DL (ref 70–99)
HCT VFR BLD CALC: 42.2 % (ref 42–52)
HEMOGLOBIN: 14.1 GM/DL (ref 13.5–18)
MCH RBC QN AUTO: 32.1 PG (ref 27–31)
MCHC RBC AUTO-ENTMCNC: 33.4 % (ref 32–36)
MCV RBC AUTO: 96.1 FL (ref 78–100)
PDW BLD-RTO: 14.4 % (ref 11.7–14.9)
PLATELET # BLD: 223 K/CU MM (ref 140–440)
PMV BLD AUTO: 9.5 FL (ref 7.5–11.1)
POTASSIUM SERPL-SCNC: 3.9 MMOL/L (ref 3.5–5.1)
RBC # BLD: 4.39 M/CU MM (ref 4.6–6.2)
SODIUM BLD-SCNC: 140 MMOL/L (ref 135–145)
WBC # BLD: 6.6 K/CU MM (ref 4–10.5)

## 2023-10-27 PROCEDURE — 80048 BASIC METABOLIC PNL TOTAL CA: CPT

## 2023-10-27 PROCEDURE — 86850 RBC ANTIBODY SCREEN: CPT

## 2023-10-27 PROCEDURE — 85027 COMPLETE CBC AUTOMATED: CPT

## 2023-10-27 PROCEDURE — 86900 BLOOD TYPING SEROLOGIC ABO: CPT

## 2023-10-27 PROCEDURE — 86901 BLOOD TYPING SEROLOGIC RH(D): CPT

## 2023-10-27 PROCEDURE — 36415 COLL VENOUS BLD VENIPUNCTURE: CPT

## 2023-10-27 RX ORDER — UMECLIDINIUM BROMIDE AND VILANTEROL TRIFENATATE 62.5; 25 UG/1; UG/1
1 POWDER RESPIRATORY (INHALATION) DAILY
Qty: 1 EACH | Refills: 5 | Status: SHIPPED | OUTPATIENT
Start: 2023-10-27

## 2023-10-30 ENCOUNTER — HOSPITAL ENCOUNTER (OUTPATIENT)
Dept: CARDIAC CATH/INVASIVE PROCEDURES | Age: 68
Discharge: HOME | End: 2023-10-30
Attending: INTERNAL MEDICINE | Admitting: INTERNAL MEDICINE
Payer: MEDICARE

## 2023-10-30 VITALS
BODY MASS INDEX: 40.09 KG/M2 | TEMPERATURE: 96.2 F | SYSTOLIC BLOOD PRESSURE: 146 MMHG | RESPIRATION RATE: 20 BRPM | HEIGHT: 70 IN | HEART RATE: 56 BPM | OXYGEN SATURATION: 99 % | WEIGHT: 280 LBS | DIASTOLIC BLOOD PRESSURE: 95 MMHG

## 2023-10-30 PROBLEM — R06.09 DOE (DYSPNEA ON EXERTION): Status: ACTIVE | Noted: 2020-02-05

## 2023-10-30 PROCEDURE — 2580000003 HC RX 258: Performed by: INTERNAL MEDICINE

## 2023-10-30 PROCEDURE — 93458 L HRT ARTERY/VENTRICLE ANGIO: CPT | Performed by: INTERNAL MEDICINE

## 2023-10-30 PROCEDURE — 6360000002 HC RX W HCPCS

## 2023-10-30 PROCEDURE — 2709999900 HC NON-CHARGEABLE SUPPLY

## 2023-10-30 PROCEDURE — 93458 L HRT ARTERY/VENTRICLE ANGIO: CPT

## 2023-10-30 PROCEDURE — 6370000000 HC RX 637 (ALT 250 FOR IP): Performed by: INTERNAL MEDICINE

## 2023-10-30 PROCEDURE — C1894 INTRO/SHEATH, NON-LASER: HCPCS

## 2023-10-30 PROCEDURE — 6360000004 HC RX CONTRAST MEDICATION

## 2023-10-30 PROCEDURE — C1769 GUIDE WIRE: HCPCS

## 2023-10-30 PROCEDURE — 2500000003 HC RX 250 WO HCPCS

## 2023-10-30 RX ORDER — SODIUM CHLORIDE 0.9 % (FLUSH) 0.9 %
5-40 SYRINGE (ML) INJECTION PRN
Status: DISCONTINUED | OUTPATIENT
Start: 2023-10-30 | End: 2023-10-30 | Stop reason: HOSPADM

## 2023-10-30 RX ORDER — 0.9 % SODIUM CHLORIDE 0.9 %
500 INTRAVENOUS SOLUTION INTRAVENOUS ONCE
Status: DISCONTINUED | OUTPATIENT
Start: 2023-10-30 | End: 2023-10-30 | Stop reason: HOSPADM

## 2023-10-30 RX ORDER — ACETAMINOPHEN 325 MG/1
650 TABLET ORAL EVERY 4 HOURS PRN
Status: DISCONTINUED | OUTPATIENT
Start: 2023-10-30 | End: 2023-10-30 | Stop reason: HOSPADM

## 2023-10-30 RX ORDER — SODIUM CHLORIDE 0.9 % (FLUSH) 0.9 %
5-40 SYRINGE (ML) INJECTION EVERY 12 HOURS SCHEDULED
Status: DISCONTINUED | OUTPATIENT
Start: 2023-10-30 | End: 2023-10-30 | Stop reason: HOSPADM

## 2023-10-30 RX ORDER — DIAZEPAM 5 MG/1
5 TABLET ORAL ONCE
Status: COMPLETED | OUTPATIENT
Start: 2023-10-30 | End: 2023-10-30

## 2023-10-30 RX ORDER — SODIUM CHLORIDE 9 MG/ML
INJECTION, SOLUTION INTRAVENOUS PRN
Status: DISCONTINUED | OUTPATIENT
Start: 2023-10-30 | End: 2023-10-30 | Stop reason: HOSPADM

## 2023-10-30 RX ORDER — SODIUM CHLORIDE 9 MG/ML
INJECTION, SOLUTION INTRAVENOUS CONTINUOUS
Status: DISCONTINUED | OUTPATIENT
Start: 2023-10-30 | End: 2023-10-30 | Stop reason: HOSPADM

## 2023-10-30 RX ORDER — DIPHENHYDRAMINE HCL 25 MG
25 TABLET ORAL ONCE
Status: COMPLETED | OUTPATIENT
Start: 2023-10-30 | End: 2023-10-30

## 2023-10-30 RX ADMIN — SODIUM CHLORIDE: 9 INJECTION, SOLUTION INTRAVENOUS at 08:41

## 2023-10-30 RX ADMIN — DIAZEPAM 5 MG: 5 TABLET ORAL at 08:41

## 2023-10-30 RX ADMIN — DIPHENHYDRAMINE HYDROCHLORIDE 25 MG: 25 TABLET ORAL at 08:41

## 2023-10-30 NOTE — PLAN OF CARE
All discharge instructions explained to the patient by Devorah Reynoso RN at this time. No bleeding or hematoma noted along site. Patient walked to the bathroom without difficulty and IV removed per discharge orders. Patient denies any additional needs and all vitals stable for discharge home.      10/30/2023

## 2023-10-30 NOTE — H&P
Deondre Aguillon MD                                  CARDIOLOGY  NOTE         Chief Complaint:       GRAVES     ECHO 8/1/2023    Technically difficult examination due to poor acoustic windows. Left ventricular function and size is normal, EF is estimated at 55-60%. Mild left ventricular hypertrophy. Grade I diastolic dysfunction. No regional wall motion abnormalities were detected. Moderately enlarged right ventricle cavity. Sclerotic, but non-stenotic aortic valve. Moderate aortic regurgitation; PHT: 463msec. Increase velocities thru the LVOT. Dilation of ascending aorta . (4.2cm)   No evidence of pericardial effusion. 48-hour Holter monitor    Indication shortness of breath  Minimum heart rate 41 bpm  Average heart rate 58 bpm  Maximum heart rate 130 bpm  Normal sinus rhythm  Rare PVCs  Rare PACs 150  Some artifacts noted    Conclusion:    Normal sinus rhythm, unremarkable study otherwise      Stress MPI  8/1/2023     Large sized defect of moderate severity which is persistent involving   inferior wall of myocardium. Decreased uptake inferiorly due to diaphragmatic artifact or prior   infarction. Normal EF 57 % with normal ventricular contractility. Recommendation   Abnormal Stress MPI with inferior wall defect, diaphragmatic attenuation vs   prior infarction   Outpt visit to further discuss    HPI:     Shila Goldsmith is a 76y.o. year old male presents to clinic with chief complaint of shortness of breath dizziness palpitations. As per patient he smokes 8 to 10 cigarettes/day currently used to smoke over a pack. Has been diagnosed with COPD and sleep apnea and follows up with Dr. Louisa Pacheco -recently patient underwent CT scan for screening and was noted to have moderate coronary artery calcification.     Given above symptoms and calcification noted on coronary arteries patient was referred for evaluation      EKG in office today shows normal sinus rhythm with intraventricular conduction delay and

## 2023-11-07 ENCOUNTER — OFFICE VISIT (OUTPATIENT)
Dept: CARDIOLOGY CLINIC | Age: 68
End: 2023-11-07
Payer: MEDICARE

## 2023-11-07 VITALS
BODY MASS INDEX: 39.37 KG/M2 | HEIGHT: 70 IN | SYSTOLIC BLOOD PRESSURE: 124 MMHG | WEIGHT: 275 LBS | DIASTOLIC BLOOD PRESSURE: 70 MMHG | HEART RATE: 72 BPM

## 2023-11-07 DIAGNOSIS — I25.10 CAD IN NATIVE ARTERY: Primary | ICD-10-CM

## 2023-11-07 DIAGNOSIS — R00.2 PALPITATIONS: ICD-10-CM

## 2023-11-07 DIAGNOSIS — R06.02 SHORTNESS OF BREATH: ICD-10-CM

## 2023-11-07 DIAGNOSIS — E66.09 CLASS 1 OBESITY DUE TO EXCESS CALORIES WITH SERIOUS COMORBIDITY AND BODY MASS INDEX (BMI) OF 30.0 TO 30.9 IN ADULT: ICD-10-CM

## 2023-11-07 DIAGNOSIS — I10 ESSENTIAL HYPERTENSION: ICD-10-CM

## 2023-11-07 DIAGNOSIS — E78.5 DYSLIPIDEMIA: ICD-10-CM

## 2023-11-07 PROCEDURE — G8427 DOCREV CUR MEDS BY ELIG CLIN: HCPCS | Performed by: INTERNAL MEDICINE

## 2023-11-07 PROCEDURE — G8484 FLU IMMUNIZE NO ADMIN: HCPCS | Performed by: INTERNAL MEDICINE

## 2023-11-07 PROCEDURE — 3017F COLORECTAL CA SCREEN DOC REV: CPT | Performed by: INTERNAL MEDICINE

## 2023-11-07 PROCEDURE — G8417 CALC BMI ABV UP PARAM F/U: HCPCS | Performed by: INTERNAL MEDICINE

## 2023-11-07 PROCEDURE — 1124F ACP DISCUSS-NO DSCNMKR DOCD: CPT | Performed by: INTERNAL MEDICINE

## 2023-11-07 PROCEDURE — 4004F PT TOBACCO SCREEN RCVD TLK: CPT | Performed by: INTERNAL MEDICINE

## 2023-11-07 PROCEDURE — 99214 OFFICE O/P EST MOD 30 MIN: CPT | Performed by: INTERNAL MEDICINE

## 2023-11-07 PROCEDURE — 3078F DIAST BP <80 MM HG: CPT | Performed by: INTERNAL MEDICINE

## 2023-11-07 PROCEDURE — 3074F SYST BP LT 130 MM HG: CPT | Performed by: INTERNAL MEDICINE

## 2023-11-07 RX ORDER — ATORVASTATIN CALCIUM 40 MG/1
40 TABLET, FILM COATED ORAL NIGHTLY
Qty: 30 TABLET | Refills: 5 | Status: SHIPPED | OUTPATIENT
Start: 2023-11-07

## 2023-11-07 RX ORDER — DOXAZOSIN MESYLATE 4 MG/1
4 TABLET ORAL DAILY
Qty: 30 TABLET | Refills: 5 | Status: SHIPPED | OUTPATIENT
Start: 2023-11-07

## 2023-11-07 RX ORDER — AMLODIPINE BESYLATE 5 MG/1
5 TABLET ORAL DAILY
Qty: 30 TABLET | Refills: 5 | Status: SHIPPED | OUTPATIENT
Start: 2023-11-07

## 2023-11-07 RX ORDER — ATORVASTATIN CALCIUM 40 MG/1
40 TABLET, FILM COATED ORAL
Qty: 90 TABLET | OUTPATIENT
Start: 2023-11-07

## 2023-11-07 NOTE — PATIENT INSTRUCTIONS
We are committed to providing you the best care possible. If you receive a survey after visiting one of our offices, please take time to share your experience concerning your physician office visit. These surveys are confidential and no health information about you is shared. We are eager to improve for you and we are counting on your feedback to help make that happen. Please be informed that if you contact our office outside of normal business hours the physician on call cannot help with any scheduling or rescheduling issues, procedure instruction questions or any type of medication issue. We advise you for any urgent/emergency that you go to the nearest emergency room! PLEASE CALL OUR OFFICE DURING NORMAL BUSINESS HOURS    Monday - Friday   8 am to 5 pm    [place]: [address] [place]: [phone]    [place]:  [phone]  **It is YOUR responsibilty to bring medication bottles and/or updated medication list to [address].  This will allow us to better serve you and all your healthcare needs**

## 2023-11-07 NOTE — PROGRESS NOTES
Mary Mckeon MD                                  CARDIOLOGY  NOTE         Chief Complaint:    Chief Complaint   Patient presents with    Follow-up     Pt denies any new cardiac sx SOB due to COPD no surgeries or procedures scheduled that he is aware of     Shortness of Breath          [facility]  10/30/2023      Indication: Abnormal Stress test with inferior wall ischemia ,   GRAVES      Mild dx in RCA/LCX   50-60 % mid LAD stenosis at the level of D2      Recommendations   Cont with medical therapy   uptitrate dual anti anginals   if symptoms persists or worsen, can consider iFR / possible PCI   LAD        ECHO 8/1/2023    Technically difficult examination due to poor acoustic windows. Left ventricular function and size is normal, EF is estimated at 55-60%. Mild left ventricular hypertrophy. Grade I diastolic dysfunction. No regional wall motion abnormalities were detected. Moderately enlarged right ventricle cavity. Sclerotic, but non-stenotic aortic valve. Moderate aortic regurgitation; PHT: 463msec. Increase velocities thru the LVOT. Dilation of ascending aorta . (4.2cm)   No evidence of pericardial effusion. 48-hour Holter monitor    Indication shortness of breath  Minimum heart rate 41 bpm  Average heart rate 58 bpm  Maximum heart rate 130 bpm  Normal sinus rhythm  Rare PVCs  Rare PACs 150  Some artifacts noted    Conclusion:    Normal sinus rhythm, unremarkable study otherwise      Stress MPI  8/1/2023     Large sized defect of moderate severity which is persistent involving   inferior wall of myocardium. Decreased uptake inferiorly due to diaphragmatic artifact or prior   infarction. Normal EF 57 % with normal ventricular contractility.       Recommendation   Abnormal Stress MPI with inferior wall defect, diaphragmatic attenuation vs   prior infarction   Outpt visit to further discuss    HPI:     Ruby Mccallum is a 76y.o. year old male presents to clinic with chief complaint of shortness of

## 2023-11-13 ENCOUNTER — TELEPHONE (OUTPATIENT)
Dept: BARIATRICS/WEIGHT MGMT | Age: 68
End: 2023-11-13

## 2023-11-14 ENCOUNTER — OFFICE VISIT (OUTPATIENT)
Dept: PULMONOLOGY | Age: 68
End: 2023-11-14
Payer: MEDICARE

## 2023-11-14 VITALS
SYSTOLIC BLOOD PRESSURE: 138 MMHG | DIASTOLIC BLOOD PRESSURE: 72 MMHG | HEART RATE: 60 BPM | OXYGEN SATURATION: 97 % | BODY MASS INDEX: 40.03 KG/M2 | WEIGHT: 279 LBS

## 2023-11-14 DIAGNOSIS — G47.33 OSA ON CPAP: ICD-10-CM

## 2023-11-14 DIAGNOSIS — Z72.0 TOBACCO ABUSE: ICD-10-CM

## 2023-11-14 DIAGNOSIS — R06.09 DOE (DYSPNEA ON EXERTION): ICD-10-CM

## 2023-11-14 DIAGNOSIS — J44.9 COPD, MILD (HCC): Primary | ICD-10-CM

## 2023-11-14 PROCEDURE — G8427 DOCREV CUR MEDS BY ELIG CLIN: HCPCS | Performed by: INTERNAL MEDICINE

## 2023-11-14 PROCEDURE — 4004F PT TOBACCO SCREEN RCVD TLK: CPT | Performed by: INTERNAL MEDICINE

## 2023-11-14 PROCEDURE — 1124F ACP DISCUSS-NO DSCNMKR DOCD: CPT | Performed by: INTERNAL MEDICINE

## 2023-11-14 PROCEDURE — G8417 CALC BMI ABV UP PARAM F/U: HCPCS | Performed by: INTERNAL MEDICINE

## 2023-11-14 PROCEDURE — G8484 FLU IMMUNIZE NO ADMIN: HCPCS | Performed by: INTERNAL MEDICINE

## 2023-11-14 PROCEDURE — 3017F COLORECTAL CA SCREEN DOC REV: CPT | Performed by: INTERNAL MEDICINE

## 2023-11-14 PROCEDURE — 99213 OFFICE O/P EST LOW 20 MIN: CPT | Performed by: INTERNAL MEDICINE

## 2023-11-14 PROCEDURE — 3023F SPIROM DOC REV: CPT | Performed by: INTERNAL MEDICINE

## 2023-11-14 RX ORDER — UMECLIDINIUM BROMIDE AND VILANTEROL TRIFENATATE 62.5; 25 UG/1; UG/1
1 POWDER RESPIRATORY (INHALATION) DAILY
Qty: 1 EACH | Refills: 11 | Status: SHIPPED | OUTPATIENT
Start: 2023-11-14

## 2023-11-14 RX ORDER — ALBUTEROL SULFATE 90 UG/1
2 AEROSOL, METERED RESPIRATORY (INHALATION) EVERY 4 HOURS PRN
Qty: 1 EACH | Refills: 11 | Status: SHIPPED | OUTPATIENT
Start: 2023-11-14

## 2024-07-31 RX ORDER — DOXAZOSIN MESYLATE 4 MG/1
4 TABLET ORAL DAILY
Qty: 30 TABLET | Refills: 5 | OUTPATIENT
Start: 2024-07-31

## 2024-08-29 ENCOUNTER — OFFICE VISIT (OUTPATIENT)
Dept: PULMONOLOGY | Age: 69
End: 2024-08-29
Payer: MEDICARE

## 2024-08-29 VITALS
BODY MASS INDEX: 34.04 KG/M2 | RESPIRATION RATE: 14 BRPM | HEIGHT: 70 IN | WEIGHT: 237.8 LBS | HEART RATE: 55 BPM | DIASTOLIC BLOOD PRESSURE: 74 MMHG | SYSTOLIC BLOOD PRESSURE: 138 MMHG | OXYGEN SATURATION: 98 %

## 2024-08-29 DIAGNOSIS — J44.1 ACUTE EXACERBATION OF CHRONIC OBSTRUCTIVE PULMONARY DISEASE (COPD) (HCC): Primary | ICD-10-CM

## 2024-08-29 DIAGNOSIS — Z72.0 TOBACCO ABUSE: ICD-10-CM

## 2024-08-29 DIAGNOSIS — J44.9 COPD, MILD (HCC): ICD-10-CM

## 2024-08-29 DIAGNOSIS — R06.09 DOE (DYSPNEA ON EXERTION): ICD-10-CM

## 2024-08-29 DIAGNOSIS — G47.33 OSA ON CPAP: ICD-10-CM

## 2024-08-29 PROCEDURE — G8427 DOCREV CUR MEDS BY ELIG CLIN: HCPCS | Performed by: INTERNAL MEDICINE

## 2024-08-29 PROCEDURE — 1124F ACP DISCUSS-NO DSCNMKR DOCD: CPT | Performed by: INTERNAL MEDICINE

## 2024-08-29 PROCEDURE — 3023F SPIROM DOC REV: CPT | Performed by: INTERNAL MEDICINE

## 2024-08-29 PROCEDURE — 4004F PT TOBACCO SCREEN RCVD TLK: CPT | Performed by: INTERNAL MEDICINE

## 2024-08-29 PROCEDURE — 99213 OFFICE O/P EST LOW 20 MIN: CPT | Performed by: INTERNAL MEDICINE

## 2024-08-29 PROCEDURE — G8417 CALC BMI ABV UP PARAM F/U: HCPCS | Performed by: INTERNAL MEDICINE

## 2024-08-29 PROCEDURE — 3017F COLORECTAL CA SCREEN DOC REV: CPT | Performed by: INTERNAL MEDICINE

## 2024-08-29 RX ORDER — ALBUTEROL SULFATE 90 UG/1
2 AEROSOL, METERED RESPIRATORY (INHALATION) EVERY 4 HOURS PRN
Qty: 1 EACH | Refills: 11 | Status: SHIPPED | OUTPATIENT
Start: 2024-08-29

## 2024-08-29 RX ORDER — HYDROXYZINE PAMOATE 50 MG/1
50 CAPSULE ORAL 2 TIMES DAILY
COMMUNITY
Start: 2024-08-06

## 2024-08-29 RX ORDER — PREDNISONE 5 MG/1
TABLET ORAL
Qty: 38 TABLET | Refills: 0 | Status: SHIPPED | OUTPATIENT
Start: 2024-08-29

## 2024-08-29 RX ORDER — UMECLIDINIUM BROMIDE AND VILANTEROL TRIFENATATE 62.5; 25 UG/1; UG/1
1 POWDER RESPIRATORY (INHALATION) DAILY
Qty: 1 EACH | Refills: 11 | Status: SHIPPED | OUTPATIENT
Start: 2024-08-29

## 2024-08-29 RX ORDER — LEVOFLOXACIN 750 MG/1
750 TABLET, FILM COATED ORAL DAILY
Qty: 5 TABLET | Refills: 0 | Status: SHIPPED | OUTPATIENT
Start: 2024-08-29 | End: 2024-09-03

## 2024-08-29 NOTE — PROGRESS NOTES
SUBJECTIVE:  Chief Complaint: Acute exacerbation COPD, mild COPD, obstructive sleep apnea on CPAP, dyspnea on exertion, tobacco abuse  Mr. Voss states that over the past several days he has not been able to wear his CPAP because he is coughing with both sinus and chest congestion.  Prior to that he was wearing CPAP about 6 hours per night and states that he got a good benefit from its use with less daytime sleepiness and improved daytime energy.  He continues on Anoro Ellipta 1 inhalation daily and his albuterol rescue inhaler as needed and has required more often recently because of his bronchitis.  He denies hemoptysis and has had no chest pain.  He continues to smoke about 8 cigarettes/day and has been advised to quit smoking on multiple occasions.  Once again we did talk about smoking cessation strategies.      ROS:  Constitution:  HEENT: Negative for ear, throat pain  Cardiovascular: Negative for chest pain, syncope, edema  Pulmonary: See HPI  Musculoskeletal: Negative for DVT, myalgias, arthralgias    OBJECTIVE:  /74 (Site: Left Upper Arm, Position: Sitting, Cuff Size: Large Adult)   Pulse 55   Resp 14   Ht 1.778 m (5' 10\")   Wt 107.9 kg (237 lb 12.8 oz)   SpO2 98%   BMI 34.12 kg/m²      Physical Exam:  Constitutional:  He appears well developed and well-nourished.  Coughing during exam.  Not short of breath at rest.  Moderately overweight  Neck:  Supple, No palpable lymphadenopathy, No JVD  Cardiovascular:  S1, S2 Normal, Regular rhythm, no murmurs or gallops, No pericardial  rubs.  Pulmonary: Mild scattered rhonchi bilaterally with a few wheezes.  No pleural rubs noted  Abdomen: Not examined  Extremities: no edema, No DVT  Neurologic: Oriented x3, No focal deficits    Radiology: 6/23/2023 low-dose CT chest  Stable solid nodule in the left lower lobe.  Moderate coronary artery calcification  PFT: Office spirometry and 12/6/2022 demonstrate no significant airways obstruction and no significant

## 2024-09-08 RX ORDER — DOXAZOSIN 4 MG/1
4 TABLET ORAL DAILY
Qty: 90 TABLET | Refills: 1 | Status: SHIPPED | OUTPATIENT
Start: 2024-09-08

## 2024-09-26 ENCOUNTER — OFFICE VISIT (OUTPATIENT)
Dept: CARDIOLOGY CLINIC | Age: 69
End: 2024-09-26
Payer: MEDICARE

## 2024-09-26 VITALS
HEART RATE: 59 BPM | BODY MASS INDEX: 34.36 KG/M2 | DIASTOLIC BLOOD PRESSURE: 88 MMHG | SYSTOLIC BLOOD PRESSURE: 144 MMHG | WEIGHT: 240 LBS | HEIGHT: 70 IN

## 2024-09-26 DIAGNOSIS — E66.09 CLASS 1 OBESITY DUE TO EXCESS CALORIES WITH SERIOUS COMORBIDITY AND BODY MASS INDEX (BMI) OF 34.0 TO 34.9 IN ADULT: ICD-10-CM

## 2024-09-26 DIAGNOSIS — E78.5 DYSLIPIDEMIA: ICD-10-CM

## 2024-09-26 DIAGNOSIS — I25.10 CAD IN NATIVE ARTERY: Primary | ICD-10-CM

## 2024-09-26 DIAGNOSIS — I25.10 CORONARY ARTERY CALCIFICATION SEEN ON CAT SCAN: ICD-10-CM

## 2024-09-26 DIAGNOSIS — I10 ESSENTIAL HYPERTENSION: ICD-10-CM

## 2024-09-26 DIAGNOSIS — G47.33 OBSTRUCTIVE SLEEP APNEA: ICD-10-CM

## 2024-09-26 PROCEDURE — 4004F PT TOBACCO SCREEN RCVD TLK: CPT | Performed by: INTERNAL MEDICINE

## 2024-09-26 PROCEDURE — G8427 DOCREV CUR MEDS BY ELIG CLIN: HCPCS | Performed by: INTERNAL MEDICINE

## 2024-09-26 PROCEDURE — G8417 CALC BMI ABV UP PARAM F/U: HCPCS | Performed by: INTERNAL MEDICINE

## 2024-09-26 PROCEDURE — 99214 OFFICE O/P EST MOD 30 MIN: CPT | Performed by: INTERNAL MEDICINE

## 2024-09-26 PROCEDURE — 3077F SYST BP >= 140 MM HG: CPT | Performed by: INTERNAL MEDICINE

## 2024-09-26 PROCEDURE — 93000 ELECTROCARDIOGRAM COMPLETE: CPT | Performed by: INTERNAL MEDICINE

## 2024-09-26 PROCEDURE — 1124F ACP DISCUSS-NO DSCNMKR DOCD: CPT | Performed by: INTERNAL MEDICINE

## 2024-09-26 PROCEDURE — 3079F DIAST BP 80-89 MM HG: CPT | Performed by: INTERNAL MEDICINE

## 2024-09-26 PROCEDURE — 3017F COLORECTAL CA SCREEN DOC REV: CPT | Performed by: INTERNAL MEDICINE

## 2024-09-26 RX ORDER — THIAMINE MONONITRATE (VIT B1) 100 MG
100 TABLET ORAL DAILY
COMMUNITY

## 2024-09-26 RX ORDER — AMLODIPINE BESYLATE 10 MG/1
10 TABLET ORAL DAILY
Qty: 90 TABLET | Refills: 3 | Status: SHIPPED | OUTPATIENT
Start: 2024-09-26 | End: 2025-09-21

## 2025-01-03 ENCOUNTER — TELEPHONE (OUTPATIENT)
Dept: CARDIOLOGY CLINIC | Age: 70
End: 2025-01-03

## 2025-01-14 RX ORDER — METOPROLOL TARTRATE 25 MG/1
25 TABLET, FILM COATED ORAL 2 TIMES DAILY
Qty: 60 TABLET | Refills: 5 | Status: SHIPPED | OUTPATIENT
Start: 2025-01-14

## 2025-03-03 RX ORDER — DOXAZOSIN 4 MG/1
4 TABLET ORAL DAILY
Qty: 90 TABLET | Refills: 1 | Status: SHIPPED | OUTPATIENT
Start: 2025-03-03

## 2025-04-22 NOTE — PROGRESS NOTES
MRN: [id number]  Name: Neeraj Voss  : 1955    Insurance: Payor: [facility] MEDICARE /  /  /      Phone #: [phone]  Provider: Anderson Aguirre MD     Date of Visit: 2025    Reason for visit:  6 mo f/u Recent Hospitalization Date:    Reason for Hospitalization:    Last EK2024  Type of Device:       Vitals BP HR O2% WT HT ORTHO BP LYING ORTHO BP SITTING ORTHO BP SITTING   Today's Findings           Patients work up- Check List     Testing Last Date Completed Date Expected  ([facility]) Additional Notes    MA to document For provider to complete Either MA or Provider    Carotid Duplex  STAT 1 WK 6 MTH       THIS WK 2 WK 1 YEAR     Cardiac CTA  STAT 1 WK 6 MTH       THIS WK 2 WK 1 YEAR     Cardiac CT Calcium scoring  STAT 1 WK 6 MTH       THIS WK 2 WK 1 YEAR     CTA Chest, Abdomen & Pelvis  STAT 1 WK 6 MTH       THIS WK 2 WK 1 YEAR     CT Chest IV w/ Contrast  STAT 1 WK 6 MTH       THIS WK 2 WK 1 YEAR     CT Chest w/o Contrast  STAT 1 WK 6 MTH       THIS WK 2 WK 1 YEAR     CXR 2024 STAT 1 WK 6 MTH       THIS WK 2 WK 1 YEAR     ECHO  Stress Complete Limited     MRI- Cardiac  STAT 1 WK 6 MTH       THIS WK 2 WK 1 YEAR     MUGA Scan  STAT 1 WK 6 MTH       THIS WK 2 WK 1 YEAR     Nuclear Stress  Lexiscan Cardiolite     PFT  STAT 1 WK 6 MTH       THIS WK 2 WK 1 YEAR     Treadmill Stress Test  STAT 1 WK 6 MTH       THIS WK 2 WK 1 YEAR     Vascular Duplex  Lower: Right Left Bilat       Upper: Right Left Bilat     Other Test Not Listed:    Monitors Last Date Completed Day's Request/Ordered     Holter  Short term 24 hours 48 hours      Long term 3 days 7 days 14 days   Event   (1-30 days)      Procedures Last Date Performed Procedure Details Date Expected   Additional Notes    ASD Closure        Carotid Angio        Cardioversion        Heart Cath  R L R&L      Peripheral Angio  R L      PFO Closure        PTCA/PCI        JOSE        JOSE/Cardioversion        Venogram        Tilt Table        Other Type of

## 2025-04-30 ENCOUNTER — OFFICE VISIT (OUTPATIENT)
Dept: PULMONOLOGY | Age: 70
End: 2025-04-30
Payer: MEDICARE

## 2025-04-30 ENCOUNTER — TRANSCRIBE ORDERS (OUTPATIENT)
Dept: ADMINISTRATIVE | Age: 70
End: 2025-04-30

## 2025-04-30 VITALS
HEIGHT: 70 IN | WEIGHT: 240 LBS | SYSTOLIC BLOOD PRESSURE: 100 MMHG | BODY MASS INDEX: 34.36 KG/M2 | DIASTOLIC BLOOD PRESSURE: 64 MMHG | HEART RATE: 69 BPM | OXYGEN SATURATION: 98 %

## 2025-04-30 DIAGNOSIS — G47.33 OSA ON CPAP: ICD-10-CM

## 2025-04-30 DIAGNOSIS — Z72.0 TOBACCO ABUSE: ICD-10-CM

## 2025-04-30 DIAGNOSIS — R22.31: Primary | ICD-10-CM

## 2025-04-30 DIAGNOSIS — R06.09 DOE (DYSPNEA ON EXERTION): ICD-10-CM

## 2025-04-30 DIAGNOSIS — J44.9 COPD, MILD (HCC): Primary | ICD-10-CM

## 2025-04-30 PROCEDURE — 1124F ACP DISCUSS-NO DSCNMKR DOCD: CPT | Performed by: INTERNAL MEDICINE

## 2025-04-30 PROCEDURE — 3023F SPIROM DOC REV: CPT | Performed by: INTERNAL MEDICINE

## 2025-04-30 PROCEDURE — G8417 CALC BMI ABV UP PARAM F/U: HCPCS | Performed by: INTERNAL MEDICINE

## 2025-04-30 PROCEDURE — 1159F MED LIST DOCD IN RCRD: CPT | Performed by: INTERNAL MEDICINE

## 2025-04-30 PROCEDURE — 4004F PT TOBACCO SCREEN RCVD TLK: CPT | Performed by: INTERNAL MEDICINE

## 2025-04-30 PROCEDURE — 99213 OFFICE O/P EST LOW 20 MIN: CPT | Performed by: INTERNAL MEDICINE

## 2025-04-30 PROCEDURE — 3017F COLORECTAL CA SCREEN DOC REV: CPT | Performed by: INTERNAL MEDICINE

## 2025-04-30 PROCEDURE — 1160F RVW MEDS BY RX/DR IN RCRD: CPT | Performed by: INTERNAL MEDICINE

## 2025-04-30 PROCEDURE — G8427 DOCREV CUR MEDS BY ELIG CLIN: HCPCS | Performed by: INTERNAL MEDICINE

## 2025-04-30 NOTE — PROGRESS NOTES
SUBJECTIVE:  Chief Complaint: Mild COPD, obstructive sleep apnea on CPAP, dyspnea on exertion, tobacco abuse  Neeraj was recently hospitalized at [place] with cough syncope and exacerbation of COPD.  He recovered from that illness and states that he is having no difficulty breathing at this time.  He continues to smoke less than half pack of cigarettes per day but has failed to quit smoking.  He mentions that a month ago he stopped drinking.  He continues on Anoro Ellipta 1 inhalation daily and his albuterol rescue inhaler as needed.  He is not aware of any other episodes of bronchitis over the past 6 months  He also continues to wear CPAP around 6 hours per night and has fairly good daytime energy with minimal daytime sleepiness      ROS:  Constitution:  HEENT: Negative for ear, throat pain  Cardiovascular: Negative for chest pain, syncope, edema  Pulmonary: See HPI  Musculoskeletal: Negative for DVT, myalgias, arthralgias    OBJECTIVE:  /64   Pulse 69   Ht 1.778 m (5' 10\")   Wt 108.9 kg (240 lb)   SpO2 98%   BMI 34.44 kg/m²      Physical Exam:  Constitutional:  He appears well developed and well-nourished.  Did not cough during exam.  In no respiratory distress at rest  Neck:  Supple, No palpable lymphadenopathy, No JVD  Cardiovascular:  S1, S2 Normal, Regular rhythm, no murmurs or gallops, No pericardial  rubs.  Pulmonary: Breath sounds are fairly clear bilaterally without wheezing or rhonchi  Abdomen: Not examined  Extremities: no edema, No DVT  Neurologic: Oriented x3, No focal deficits    Radiology: Chest x-[facility] at [facility] on/1/25 showed no acute abnormality in the chest  PFT: Office spirometry on 12/6/2022 demonstrated no significant airways obstruction and no significant response to bronchodilator      Echocardiogram: 8/29/2023  Left ventricular function and size is normal.  Mild LVH.  Grade 1 diastolic dysfunction    ASSESSMENT:    1. COPD, mild (HCC)    2. SHAHNAZ on CPAP    3. GRAVES (dyspnea

## 2025-05-01 ENCOUNTER — OFFICE VISIT (OUTPATIENT)
Dept: CARDIOLOGY CLINIC | Age: 70
End: 2025-05-01
Payer: MEDICARE

## 2025-05-01 VITALS
HEART RATE: 60 BPM | DIASTOLIC BLOOD PRESSURE: 74 MMHG | WEIGHT: 246 LBS | SYSTOLIC BLOOD PRESSURE: 110 MMHG | HEIGHT: 70 IN | BODY MASS INDEX: 35.22 KG/M2

## 2025-05-01 DIAGNOSIS — I10 ESSENTIAL HYPERTENSION: ICD-10-CM

## 2025-05-01 DIAGNOSIS — G47.33 OBSTRUCTIVE SLEEP APNEA: ICD-10-CM

## 2025-05-01 DIAGNOSIS — Z72.0 NICOTINE ABUSE: ICD-10-CM

## 2025-05-01 DIAGNOSIS — E78.5 DYSLIPIDEMIA: ICD-10-CM

## 2025-05-01 DIAGNOSIS — I25.10 CAD IN NATIVE ARTERY: Primary | ICD-10-CM

## 2025-05-01 DIAGNOSIS — E66.812 CLASS 2 SEVERE OBESITY DUE TO EXCESS CALORIES WITH SERIOUS COMORBIDITY AND BODY MASS INDEX (BMI) OF 35.0 TO 35.9 IN ADULT (HCC): ICD-10-CM

## 2025-05-01 DIAGNOSIS — E66.01 CLASS 2 SEVERE OBESITY DUE TO EXCESS CALORIES WITH SERIOUS COMORBIDITY AND BODY MASS INDEX (BMI) OF 35.0 TO 35.9 IN ADULT (HCC): ICD-10-CM

## 2025-05-01 PROCEDURE — 99214 OFFICE O/P EST MOD 30 MIN: CPT | Performed by: INTERNAL MEDICINE

## 2025-05-01 PROCEDURE — 3017F COLORECTAL CA SCREEN DOC REV: CPT | Performed by: INTERNAL MEDICINE

## 2025-05-01 PROCEDURE — 1124F ACP DISCUSS-NO DSCNMKR DOCD: CPT | Performed by: INTERNAL MEDICINE

## 2025-05-01 PROCEDURE — 4004F PT TOBACCO SCREEN RCVD TLK: CPT | Performed by: INTERNAL MEDICINE

## 2025-05-01 PROCEDURE — 3074F SYST BP LT 130 MM HG: CPT | Performed by: INTERNAL MEDICINE

## 2025-05-01 PROCEDURE — G8428 CUR MEDS NOT DOCUMENT: HCPCS | Performed by: INTERNAL MEDICINE

## 2025-05-01 PROCEDURE — G8417 CALC BMI ABV UP PARAM F/U: HCPCS | Performed by: INTERNAL MEDICINE

## 2025-05-01 PROCEDURE — 3078F DIAST BP <80 MM HG: CPT | Performed by: INTERNAL MEDICINE

## 2025-05-01 RX ORDER — BUSPIRONE HYDROCHLORIDE 5 MG/1
5 TABLET ORAL 2 TIMES DAILY
COMMUNITY
Start: 2025-04-08

## 2025-05-01 RX ORDER — METOPROLOL TARTRATE 25 MG/1
25 TABLET, FILM COATED ORAL 2 TIMES DAILY
Qty: 60 TABLET | Refills: 5 | Status: SHIPPED | OUTPATIENT
Start: 2025-05-01

## 2025-05-01 RX ORDER — IBUPROFEN 200 MG
200 TABLET ORAL EVERY 6 HOURS PRN
COMMUNITY

## 2025-05-01 NOTE — PROGRESS NOTES
CLINICAL STAFF DOCUMENTATION    Dr. Anderson Voss  1955  [id number]    Have you had any Chest Pain recently? - No  Have you had any Shortness of Breath - Yes  When did it begin? - Years   If Yes - When on exertion  How many flights of stairs can you go up without having SOB? - 1  Are you on Oxygen during the day or at night? - No  How many liters of Oxygen are you on? -   How many pillows do you sleep with under your head? -     Have you had any dizziness - yes   If Yes DO ORTHOSTATIC BP including pulse  When do you feel dizzy? When he coughs  Does the room spin? No  How long does it last .  Passed out       Have you had any palpitations recently? - No  Any thyroid issues? - No    Do you have any edema - swelling in No      Is the patient on any of the following medications -   If Yes DO EKG - Needs done every 3 months    When did you have your last labs drawn nothing recent  What doctor ordered   Do we have the labs in their chart   If we do not have the labs, ask where they were drawn     If we do not have these labs, you are retrieve these labs for the provider!    Do you need any prescriptions refilled? - Yes    Do you have a surgery or procedure scheduled in the near future - No    Do use tobacco products? - Yes  Do you drink alcohol? - No  Do you use any illicit drugs? - No  Caffeine? - Yes  How much caffeine? .2  bottles

## 2025-05-01 NOTE — PROGRESS NOTES
Anderson Aguirre MD                                  CARDIOLOGY  NOTE         Chief Complaint:    Chief Complaint   Patient presents with    6 Month Follow-Up    Shortness of Breath        ECHO @ [place] 4/2/2025    1. Left ventricle: The cavity size is normal. Wall thickness is mildly      increased. Systolic function is normal. The estimated ejection fraction      is 60-65%. Although no diagnostic regional wall motion abnormality is      identified, this possibility cannot be completely excluded on the basis      of this study. Doppler parameters are consistent with abnormal left      ventricular relaxation (grade 1 diastolic dysfunction).   2. Aortic valve: There is mild to moderate regurgitation. The peak systolic      velocity is 1.63m/sec. The mean systolic gradient is 6mm Hg. The LVOT to      aortic valve VTI ratio is 0.74. The valve area (VTI) is 2.8cm^2.   3. Mitral valve: The valve area by pressure half-time is 2.6cm^2.   4. Left atrium: The estimated left atrial pressure is 13.2mm Hg.   5. Right ventricle: The cavity size is normal. Systolic function is normal.   6. Right atrium: The atrium is dilated.       [facility]  10/30/2023      Indication: Abnormal Stress test with inferior wall ischemia ,   GRAVES      Mild dx in RCA/LCX   50-60 % mid LAD stenosis at the level of D2      Recommendations   Cont with medical therapy   uptitrate dual anti anginals   if symptoms persists or worsen, can consider iFR / possible PCI   LAD        ECHO 8/1/2023    Technically difficult examination due to poor acoustic windows.   Left ventricular function and size is normal, EF is estimated at 55-60%.   Mild left ventricular hypertrophy.   Grade I diastolic dysfunction.   No regional wall motion abnormalities were detected.   Moderately enlarged right ventricle cavity.   Sclerotic, but non-stenotic aortic valve.   Moderate aortic regurgitation; PHT: 463msec.   Increase velocities thru the LVOT.   Dilation of ascending aorta

## 2025-05-05 ENCOUNTER — HOSPITAL ENCOUNTER (OUTPATIENT)
Dept: CT IMAGING | Age: 70
Discharge: HOME | End: 2025-05-05
Attending: INTERNAL MEDICINE
Payer: MEDICARE

## 2025-05-05 DIAGNOSIS — R06.09 DOE (DYSPNEA ON EXERTION): ICD-10-CM

## 2025-05-05 DIAGNOSIS — G47.33 OSA ON CPAP: ICD-10-CM

## 2025-05-05 DIAGNOSIS — Z72.0 TOBACCO ABUSE: ICD-10-CM

## 2025-05-05 DIAGNOSIS — J44.9 COPD, MILD (HCC): ICD-10-CM

## 2025-05-05 PROCEDURE — 71250 CT THORAX DX C-: CPT

## 2025-05-06 ENCOUNTER — RESULTS FOLLOW-UP (OUTPATIENT)
Dept: PULMONOLOGY | Age: 70
End: 2025-05-06

## 2025-05-06 NOTE — RESULT ENCOUNTER NOTE
I spoke to Mr. Voss over the phone and reviewed his low-dose CT lung screen done on 5/5/2025.  There are no suspicious nodules or masses.  There are some slightly/borderline enlarged paratracheal and subcarinal lymph nodes.  Also noticed some mild linear atelectasis at the lung bases.  Once again I urged him to consider cigarette smoking cessation.  [facility] close pulmonary will continue to follow Mr. Voss

## 2025-05-14 ENCOUNTER — HOSPITAL ENCOUNTER (OUTPATIENT)
Dept: ULTRASOUND IMAGING | Age: 70
Discharge: HOME | End: 2025-05-14
Payer: MEDICARE

## 2025-05-14 DIAGNOSIS — R22.31: ICD-10-CM

## 2025-05-14 DIAGNOSIS — R22.9 LOCALIZED SUPERFICIAL SWELLING, MASS, OR LUMP: ICD-10-CM

## 2025-05-14 PROCEDURE — 76882 US LMTD JT/FCL EVL NVASC XTR: CPT

## 2025-05-19 ENCOUNTER — TELEPHONE (OUTPATIENT)
Dept: PULMONOLOGY | Age: 70
End: 2025-05-19

## 2025-05-19 NOTE — TELEPHONE ENCOUNTER
Nancy from [facility] called to let us know pt had taken his pap in due to mold around wanter chamber, this is the third time this has happened. She is requesting you addend your note to state \"Patients cpap device has had ongoing issues and patient would benefit from a new cpap machine.\"    I will send notes and a new order when this is completed.

## 2025-07-03 RX ORDER — UMECLIDINIUM BROMIDE AND VILANTEROL TRIFENATATE 62.5; 25 UG/1; UG/1
1 POWDER RESPIRATORY (INHALATION) DAILY
Qty: 60 EACH | Refills: 5 | Status: SHIPPED | OUTPATIENT
Start: 2025-07-03

## 2025-07-10 RX ORDER — DOXAZOSIN 4 MG/1
4 TABLET ORAL DAILY
Qty: 90 TABLET | Refills: 1 | Status: SHIPPED | OUTPATIENT
Start: 2025-07-10

## 2025-07-21 ENCOUNTER — PROCEDURE VISIT (OUTPATIENT)
Age: 70
End: 2025-07-21

## 2025-07-21 DIAGNOSIS — M54.12 CERVICAL RADICULOPATHY: ICD-10-CM

## 2025-07-21 DIAGNOSIS — G56.03 BILATERAL CARPAL TUNNEL SYNDROME: Primary | ICD-10-CM

## 2025-07-21 NOTE — PROGRESS NOTES
EMG Upper Limbs:   EMG/NCS UPPER EXTREMITIES:    Reason for referral/Clinical data:  Mina is being seen today for right upper extremity EMG to assess numbness in the right arm.  He is not specific in where the arm numbness is.  Does point to the forearm but is uncertain if it is medial greater than lateral aspect of the forearm.  Uncertain if it involves the hand to much extent.  He does endorse that he will notice this upon awakening sometimes but does not specifically feel as though it awakens him.    Refer to the Electrodiagnostic Data Sheet for normative values, specific techniques utilized for conductions, the numeric values obtained on nerve conductions, and specific muscles sampled for needle examination.   Informed consent was given by the patient after discussion of the following - Expected potential benefits of procedure include the following: identifying diagnoses, excluding diagnoses, and helping the treating practitioners to prescribe treatment, testing, and follow-up. Possible adverse events of electrodiagnostic testing include local discomfort, mild bleeding or bruising (common) and rare/uncommon events such as infection, nausea, fainting, or other idiosyncratic adverse events.    Motor studies:  -- Right median motor response demonstrates amplitude which is normal, distal latency which is mildly prolonged, and conduction velocity which is normal.  -- Left median motor response demonstrates amplitude which is normal, distal latency which is mildly prolonged.  -- Right ulnar motor response demonstrates amplitude which is normal, distal latency which is normal, and conduction velocity which is normal in both forearm and elbow segments.   Александр-Jluis anastamosis findings are not identified.      Sensory studies:  -- Right median sensory nerve conduction response demonstrates amplitude which is mildly reduced and distal latency which is prolonged.   -- Left median sensory nerve conduction response

## 2025-07-29 ENCOUNTER — OFFICE VISIT (OUTPATIENT)
Dept: PULMONOLOGY | Age: 70
End: 2025-07-29
Payer: MEDICARE

## 2025-07-29 VITALS
SYSTOLIC BLOOD PRESSURE: 124 MMHG | HEART RATE: 63 BPM | DIASTOLIC BLOOD PRESSURE: 66 MMHG | HEIGHT: 70 IN | BODY MASS INDEX: 35.22 KG/M2 | OXYGEN SATURATION: 94 % | WEIGHT: 246 LBS

## 2025-07-29 DIAGNOSIS — J44.9 COPD, MILD (HCC): Primary | ICD-10-CM

## 2025-07-29 DIAGNOSIS — Z72.0 TOBACCO ABUSE: ICD-10-CM

## 2025-07-29 DIAGNOSIS — R05.3 CHRONIC COUGH: ICD-10-CM

## 2025-07-29 DIAGNOSIS — R06.09 DOE (DYSPNEA ON EXERTION): ICD-10-CM

## 2025-07-29 DIAGNOSIS — G47.33 OSA ON CPAP: ICD-10-CM

## 2025-07-29 PROCEDURE — 1159F MED LIST DOCD IN RCRD: CPT | Performed by: INTERNAL MEDICINE

## 2025-07-29 PROCEDURE — 99213 OFFICE O/P EST LOW 20 MIN: CPT | Performed by: INTERNAL MEDICINE

## 2025-07-29 PROCEDURE — 3023F SPIROM DOC REV: CPT | Performed by: INTERNAL MEDICINE

## 2025-07-29 PROCEDURE — G8427 DOCREV CUR MEDS BY ELIG CLIN: HCPCS | Performed by: INTERNAL MEDICINE

## 2025-07-29 PROCEDURE — 4004F PT TOBACCO SCREEN RCVD TLK: CPT | Performed by: INTERNAL MEDICINE

## 2025-07-29 PROCEDURE — 1124F ACP DISCUSS-NO DSCNMKR DOCD: CPT | Performed by: INTERNAL MEDICINE

## 2025-07-29 PROCEDURE — 3017F COLORECTAL CA SCREEN DOC REV: CPT | Performed by: INTERNAL MEDICINE

## 2025-07-29 PROCEDURE — G8417 CALC BMI ABV UP PARAM F/U: HCPCS | Performed by: INTERNAL MEDICINE

## 2025-07-29 PROCEDURE — 1160F RVW MEDS BY RX/DR IN RCRD: CPT | Performed by: INTERNAL MEDICINE

## 2025-07-29 RX ORDER — TEMAZEPAM 15 MG/1
15 CAPSULE ORAL NIGHTLY PRN
COMMUNITY

## 2025-07-29 NOTE — PROGRESS NOTES
SUBJECTIVE:  Chief Complaint: Mild COPD, obstructive sleep apnea on APAP dyspnea on exertion, tobacco abuse, chronic cough    Mr. Voss states that he went to [facility] several weeks ago with persistent cough and chest congestion.  He was given antibiotics and oral steroids and he has improved.  He did not have cough syncope.  He continues on Anoro Ellipta 1 inhalation daily and albuterol rescue inhaler as needed.  He states that he is wearing his CPAP nightly but I reviewed his compliance report.  He is wearing his APAP slightly over 4 hours per night when he does use it but unfortunately is not compliant having 7 days out of 24 where he wore his APAP less than 4 hours.  His AHI is 4.2 but he has a significant leak.  He is wearing a fullface mask.  He states that his daytime sleepiness is under fairly good control but he still has lack of daytime energy.  He continues to smoke less than 1/2 pack of cigarettes per day and once again I urged him to consider smoking cessation    ROS:  Constitution:  HEENT: Negative for ear, throat pain  Cardiovascular: Negative for chest pain, syncope, edema  Pulmonary: See HPI  Musculoskeletal: Negative for DVT, myalgias, arthralgias    OBJECTIVE:  /66   Pulse 63   Ht 1.778 m (5' 10\")   Wt 111.6 kg (246 lb)   SpO2 94%   BMI 35.30 kg/m²      Physical Exam:  Constitutional:  He appears well developed and well-nourished.  Did not cough during exam.  In no respiratory distress  Neck:  Supple, No palpable lymphadenopathy, No JVD  Cardiovascular:  S1, S2 Normal, Regular rhythm, no murmurs or gallops, No pericardial  rubs.  Pulmonary: Breath sounds are clear throughout all areas without wheezing or rhonchi  Abdomen: Not examined  Extremities: no edema, No DVT  Neurologic: Oriented x3, No focal deficits    Radiology: Low-dose CT lung screen 5/6/2025  No acute abnormality  Borderline enlarged mediastinal lymph nodes  Atherosclerotic disease  PFT: Office spirometry on 12/6/2022

## 2025-09-02 ENCOUNTER — SCHEDULED TELEPHONE ENCOUNTER (OUTPATIENT)
Dept: PULMONOLOGY | Age: 70
End: 2025-09-02

## 2025-09-02 DIAGNOSIS — R06.09 DOE (DYSPNEA ON EXERTION): ICD-10-CM

## 2025-09-02 DIAGNOSIS — G47.33 OSA ON CPAP: ICD-10-CM

## 2025-09-02 DIAGNOSIS — J44.9 COPD, MILD (HCC): ICD-10-CM

## 2025-09-02 DIAGNOSIS — J44.1 ACUTE EXACERBATION OF CHRONIC OBSTRUCTIVE PULMONARY DISEASE (COPD) (HCC): Primary | ICD-10-CM

## 2025-09-02 DIAGNOSIS — Z72.0 TOBACCO ABUSE: ICD-10-CM

## 2025-09-02 DIAGNOSIS — R05.3 CHRONIC COUGH: ICD-10-CM

## 2025-09-02 RX ORDER — PREDNISONE 5 MG/1
TABLET ORAL
Qty: 38 TABLET | Refills: 0 | Status: SHIPPED | OUTPATIENT
Start: 2025-09-02

## 2025-09-02 RX ORDER — BENZONATATE 200 MG/1
200 CAPSULE ORAL 3 TIMES DAILY PRN
Qty: 30 CAPSULE | Refills: 0 | Status: SHIPPED | OUTPATIENT
Start: 2025-09-02 | End: 2025-09-12

## (undated) DEVICE — SYRINGE INFL 60ML DISP ALLIANCE II

## (undated) DEVICE — Z DISCONTINUED (USE MFG CAT MVABO)  TUBING GAS SAMPLING STD 6.5 FT FEMALE CONN SMRT CAPNOLINE

## (undated) DEVICE — HERCULES 3 STAGE BALLOON ESOPHAGEAL: Brand: HERCULES